# Patient Record
Sex: MALE | Race: WHITE | NOT HISPANIC OR LATINO | Employment: FULL TIME | URBAN - METROPOLITAN AREA
[De-identification: names, ages, dates, MRNs, and addresses within clinical notes are randomized per-mention and may not be internally consistent; named-entity substitution may affect disease eponyms.]

---

## 2018-01-30 ENCOUNTER — OFFICE VISIT (OUTPATIENT)
Dept: URGENT CARE | Facility: CLINIC | Age: 33
End: 2018-01-30
Payer: COMMERCIAL

## 2018-01-30 VITALS
WEIGHT: 130 LBS | SYSTOLIC BLOOD PRESSURE: 118 MMHG | DIASTOLIC BLOOD PRESSURE: 72 MMHG | HEIGHT: 70 IN | BODY MASS INDEX: 18.61 KG/M2 | TEMPERATURE: 103 F | HEART RATE: 107 BPM | OXYGEN SATURATION: 98 %

## 2018-01-30 DIAGNOSIS — R52 BODY ACHES: Primary | ICD-10-CM

## 2018-01-30 DIAGNOSIS — J10.1 INFLUENZA A: ICD-10-CM

## 2018-01-30 LAB
CTP QC/QA: YES
FLUAV AG NPH QL: POSITIVE
FLUBV AG NPH QL: NEGATIVE

## 2018-01-30 PROCEDURE — 99203 OFFICE O/P NEW LOW 30 MIN: CPT | Mod: S$GLB,,, | Performed by: NURSE PRACTITIONER

## 2018-01-30 PROCEDURE — 87804 INFLUENZA ASSAY W/OPTIC: CPT | Mod: 59,QW,S$GLB, | Performed by: NURSE PRACTITIONER

## 2018-01-30 PROCEDURE — 3008F BODY MASS INDEX DOCD: CPT | Mod: S$GLB,,, | Performed by: NURSE PRACTITIONER

## 2018-01-30 RX ORDER — FLUTICASONE PROPIONATE 50 MCG
2 SPRAY, SUSPENSION (ML) NASAL DAILY
Qty: 1 BOTTLE | Refills: 0 | Status: SHIPPED | OUTPATIENT
Start: 2018-01-30 | End: 2019-01-30

## 2018-01-30 RX ORDER — IBUPROFEN 200 MG
600 TABLET ORAL
Status: COMPLETED | OUTPATIENT
Start: 2018-01-30 | End: 2018-01-30

## 2018-01-30 RX ORDER — ACETAMINOPHEN 500 MG
500 TABLET ORAL EVERY 6 HOURS PRN
Status: ON HOLD | COMMUNITY
End: 2023-03-15

## 2018-01-30 RX ADMIN — Medication 600 MG: at 12:01

## 2018-01-30 NOTE — PATIENT INSTRUCTIONS
Influenza (Adult)    Influenza is also called the flu. It is a viral illness that affects the air passages of your lungs. It is different from the common cold. The flu can easily be passed from one to person to another. It may be spread through the air by coughing and sneezing. Or it can be spread by touching the sick person and then touching your own eyes, nose, or mouth.  The flu starts 1 to 3 days after you are exposed to the flu virus. It may last for 1 to 2 weeks but many people feel tired or fatigued for many weeks afterward. You usually dont need to take antibiotics unless you have a complication. This might be an ear or sinus infection or pneumonia.  Symptoms of the flu may be mild or severe. They can include extreme tiredness (wanting to stay in bed all day), chills, fevers, muscle aches, soreness with eye movement, headache, and a dry, hacking cough.  Home care  Follow these guidelines when caring for yourself at home:  · Avoid being around cigarette smoke, whether yours or other peoples.  · Acetaminophen or ibuprofen will help ease your fever, muscle aches, and headache. Dont give aspirin to anyone younger than 18 who has the flu. Aspirin can harm the liver.  · Nausea and loss of appetite are common with the flu. Eat light meals. Drink 6 to 8 glasses of liquids every day. Good choices are water, sport drinks, soft drinks without caffeine, juices, tea, and soup. Extra fluids will also help loosen secretions in your nose and lungs.  · Over-the-counter cold medicines will not make the flu go away faster. But the medicines may help with coughing, sore throat, and congestion in your nose and sinuses. Dont use a decongestant if you have high blood pressure.  · Stay home until your fever has been gone for at least 24 hours without using medicine to reduce fever.  Follow-up care  Follow up with your healthcare provider, or as advised, if you are not getting better over the next week.  If you are age 65 or  older, talk with your provider about getting a pneumococcal vaccine every 5 years. You should also get this vaccine if you have chronic asthma or COPD. All adults should get a flu vaccine every fall. Ask your provider about this.  When to seek medical advice  Call your healthcare provider right away if any of these occur:  · Cough with lots of colored mucus (sputum) or blood in your mucus  · Chest pain, shortness of breath, wheezing, or trouble breathing  · Severe headache, or face, neck, or ear pain  · New rash with fever  · Fever of 100.4°F (38°C) or higher, or as directed by your healthcare provider  · Confusion, behavior change, or seizure  · Severe weakness or dizziness  · You get a new fever or cough after getting better for a few days  Date Last Reviewed: 1/1/2017 © 2000-2017 [employer]. [address] [place] [address], [place] [address]. All rights reserved. This information is not intended as a substitute for professional medical care. Always follow your healthcare professional's instructions.    You have been diagnosed with Influenza.   You are contagious for 24 hours after you start the Tamilfu or 24 hours after your last fever, whichever happens last.  Please drink plenty of fluids.  Please get plenty of rest.  Please return here or go to the Emergency Department for any concerns or worsening of condition.  Tamiflu prescription has been discussed and if prescribed, please take to completion unless you cannot tolerate the side effects.   If you were prescribed a narcotic medication, do not drive or operate heavy equipment or machinery while taking these medications.  If you were given a steroid shot in the clinic and have also been given a prescription for a steroid such as Prednisone or a Medrol Dose Pack, please begin taking them tomorrow.  Take tylenol (acetominophen) for fever, chills or body aches every 4 hours. do not exceed 4000 mg/ day.  Take Motrin (Ibuprofen) every 4 hours for fever,  chills, pain or inflammation.  Use an antihistmine such as claritin or zyrtec to dry you out. Use pseudoephedrine (behind the counter) to decongest (beware this can raise your blood pressure). Use mucinex (guaifenisin) to break up mucous

## 2018-01-30 NOTE — LETTER
January 30, 2018      [facility]  [facility] [address]  Phone: [phone]  Fax: [phone]       Patient: Wilma Lamas   YOB: 1985  Date of Visit: 01/30/2018    To Whom It May Concern:    Roshni Lamas  was at [facility] on 01/30/2018. He may return to work/school on 02/03/2018 with no restrictions. If you have any questions or concerns, or if I can be of further assistance, please do not hesitate to contact me.    Sincerely,    Gilberto Mae NP

## 2018-01-30 NOTE — PROGRESS NOTES
"Subjective:       Patient ID: Wilma Lamas is a 32 y.o. male.    Vitals:  height is 5' 10" (1.778 m) and weight is 59 kg (130 lb). His temperature is 102.8 °F (39.3 °C) (abnormal). His blood pressure is 118/72 and his pulse is 107. His oxygen saturation is 98%.     Chief Complaint: Generalized Body Aches and Fever    Pt in for body aches, congestion, fever, sore throat. Pt symptoms started Friday and the fever really got bad on Sunday, but then pt woke up this mornign and the fever is back. Pt has been taking nyquil and tylenol. [place]. Pt is an oceanographer.       Fever    This is a new problem. The current episode started in the past 7 days. The problem has been gradually worsening. Associated symptoms include congestion, coughing, ear pain, headaches, muscle aches and a sore throat. Pertinent negatives include no abdominal pain, chest pain, diarrhea, nausea or wheezing. He has tried acetaminophen for the symptoms.     Review of Systems   Constitution: Positive for chills, decreased appetite, fever, weakness, malaise/fatigue and night sweats.   HENT: Positive for congestion, ear pain and sore throat. Negative for hoarse voice.    Eyes: Negative for discharge and redness.   Cardiovascular: Negative for chest pain, dyspnea on exertion and leg swelling.   Respiratory: Positive for cough, shortness of breath and sputum production. Negative for wheezing.    Musculoskeletal: Negative for myalgias.   Gastrointestinal: Negative for abdominal pain, constipation, diarrhea and nausea.   Neurological: Positive for dizziness, headaches and light-headedness.       Objective:      Physical Exam   Constitutional: He is oriented to person, place, and time. He appears well-developed and well-nourished. He is cooperative.  Non-toxic appearance. He does not appear ill. No distress.   Non toxic in appearance      HENT:   Head: Normocephalic and atraumatic.   Right Ear: Hearing, tympanic membrane, " external ear and ear canal normal.   Left Ear: Hearing, tympanic membrane, external ear and ear canal normal.   Nose: Mucosal edema present. No rhinorrhea or nasal deformity. No epistaxis. Right sinus exhibits no maxillary sinus tenderness and no frontal sinus tenderness. Left sinus exhibits no maxillary sinus tenderness and no frontal sinus tenderness.   Mouth/Throat: Uvula is midline and mucous membranes are normal. No trismus in the jaw. Normal dentition. No uvula swelling. Posterior oropharyngeal erythema present.   Eyes: Conjunctivae and lids are normal. Right eye exhibits no discharge. Left eye exhibits no discharge. No scleral icterus.   Sclera clear bilat   Neck: Trachea normal, normal range of motion, full passive range of motion without pain and phonation normal. Neck supple.   Cardiovascular: Regular rhythm, normal heart sounds, intact distal pulses and normal pulses.  Tachycardia present.    Temperature is 102.8 at this meli.    Pulmonary/Chest: Effort normal and breath sounds normal. No respiratory distress.   Abdominal: Soft. Normal appearance and bowel sounds are normal. He exhibits no distension, no pulsatile midline mass and no mass. There is no tenderness.   Musculoskeletal: Normal range of motion. He exhibits no edema or deformity.   Neurological: He is alert and oriented to person, place, and time. He exhibits normal muscle tone. Coordination normal.   Skin: Skin is warm, dry and intact. He is not diaphoretic. No pallor.   Psychiatric: He has a normal mood and affect. His speech is normal and behavior is normal. Judgment and thought content normal. Cognition and memory are normal.   Nursing note and vitals reviewed.      Results for orders placed or performed in visit on 01/30/18   POCT Influenza A/B   Result Value Ref Range    Rapid Influenza A Ag Positive (A) Negative    Rapid Influenza B Ag Negative Negative     Acceptable Yes        Assessment:       1. Body aches    2.  Influenza A        Plan:         Body aches  -     POCT Influenza A/B    Influenza A  -     fluticasone (FLONASE) 50 mcg/actuation nasal spray; 2 sprays (100 mcg total) by Each Nare route once daily.  Dispense: 1 Bottle; Refill: 0  -     ibuprofen tablet 600 mg; Take 3 tablets (600 mg total) by mouth one time.

## 2023-02-22 ENCOUNTER — TELEPHONE (OUTPATIENT)
Dept: LAB | Facility: CLINIC | Age: 38
End: 2023-02-22
Payer: COMMERCIAL

## 2023-02-22 NOTE — TELEPHONE ENCOUNTER
----- Message from Heaven Peterson sent at 2/22/2023 10:17 AM CST -----  Regarding: pt called  Name of Who is Calling: PRO CARPIO [[id number]]      What is the request in detail: pt needs to establish care and needs to be seen today for a urinary issue. Please advise       Can the clinic reply by [facility]: No      What Number to [facility] Back [facility]: [phone]

## 2023-02-23 ENCOUNTER — OFFICE VISIT (OUTPATIENT)
Dept: PRIMARY CARE CLINIC | Facility: CLINIC | Age: 38
End: 2023-02-23
Payer: COMMERCIAL

## 2023-02-23 VITALS
HEIGHT: 70 IN | TEMPERATURE: 98 F | HEART RATE: 65 BPM | DIASTOLIC BLOOD PRESSURE: 76 MMHG | SYSTOLIC BLOOD PRESSURE: 118 MMHG | WEIGHT: 133.63 LBS | OXYGEN SATURATION: 99 % | BODY MASS INDEX: 19.13 KG/M2 | RESPIRATION RATE: 18 BRPM

## 2023-02-23 DIAGNOSIS — R39.11 URINARY HESITANCY: ICD-10-CM

## 2023-02-23 DIAGNOSIS — R39.12 WEAK URINARY STREAM: ICD-10-CM

## 2023-02-23 DIAGNOSIS — R33.9 URINARY RETENTION: ICD-10-CM

## 2023-02-23 DIAGNOSIS — Z00.00 ENCOUNTER FOR MEDICAL EXAMINATION TO ESTABLISH CARE: Primary | ICD-10-CM

## 2023-02-23 PROCEDURE — 80053 COMPREHEN METABOLIC PANEL: CPT | Performed by: FAMILY MEDICINE

## 2023-02-23 PROCEDURE — 87389 HIV-1 AG W/HIV-1&-2 AB AG IA: CPT | Performed by: FAMILY MEDICINE

## 2023-02-23 PROCEDURE — 3074F PR MOST RECENT SYSTOLIC BLOOD PRESSURE < 130 MM HG: ICD-10-PCS | Mod: S$GLB,,, | Performed by: FAMILY MEDICINE

## 2023-02-23 PROCEDURE — 83036 HEMOGLOBIN GLYCOSYLATED A1C: CPT | Performed by: FAMILY MEDICINE

## 2023-02-23 PROCEDURE — 85025 COMPLETE CBC W/AUTO DIFF WBC: CPT | Performed by: FAMILY MEDICINE

## 2023-02-23 PROCEDURE — 86803 HEPATITIS C AB TEST: CPT | Performed by: FAMILY MEDICINE

## 2023-02-23 PROCEDURE — 80061 LIPID PANEL: CPT | Performed by: FAMILY MEDICINE

## 2023-02-23 PROCEDURE — 3078F DIAST BP <80 MM HG: CPT | Mod: S$GLB,,, | Performed by: FAMILY MEDICINE

## 2023-02-23 PROCEDURE — 3078F PR MOST RECENT DIASTOLIC BLOOD PRESSURE < 80 MM HG: ICD-10-PCS | Mod: S$GLB,,, | Performed by: FAMILY MEDICINE

## 2023-02-23 PROCEDURE — 99205 OFFICE O/P NEW HI 60 MIN: CPT | Mod: S$GLB,,, | Performed by: FAMILY MEDICINE

## 2023-02-23 PROCEDURE — 3008F PR BODY MASS INDEX (BMI) DOCUMENTED: ICD-10-PCS | Mod: S$GLB,,, | Performed by: FAMILY MEDICINE

## 2023-02-23 PROCEDURE — 99205 PR OFFICE/OUTPT VISIT, NEW, LEVL V, 60-74 MIN: ICD-10-PCS | Mod: S$GLB,,, | Performed by: FAMILY MEDICINE

## 2023-02-23 PROCEDURE — 84153 ASSAY OF PSA TOTAL: CPT | Performed by: FAMILY MEDICINE

## 2023-02-23 PROCEDURE — 3074F SYST BP LT 130 MM HG: CPT | Mod: S$GLB,,, | Performed by: FAMILY MEDICINE

## 2023-02-23 PROCEDURE — 3008F BODY MASS INDEX DOCD: CPT | Mod: S$GLB,,, | Performed by: FAMILY MEDICINE

## 2023-02-23 RX ORDER — TAMSULOSIN HYDROCHLORIDE 0.4 MG/1
1 CAPSULE ORAL
Status: ON HOLD | COMMUNITY
Start: 2023-02-17 | End: 2023-03-15

## 2023-02-23 NOTE — ASSESSMENT & PLAN NOTE
- concern for underlying prostate dysfunction, will draw a PSA today to rule out any abnormalities  - I would like to refer patient to urology given extensive history of urinary retention and urinary difficulties  - patient to notify office if he does not receive referral within approximately 1 week  - there could be a social component to this issue  - patient was unable to provide urinary sample today to rule out any underlying infection, due to presenting problem of urinary retention  - I do not personally feel this issue should be a cause for patient not to be able to return to work, he is cleared to return to work from my standpoint

## 2023-02-23 NOTE — ASSESSMENT & PLAN NOTE
- will notify patient of laboratory values via portal, and discuss with him at follow-up appointment in approximately 1 month

## 2023-02-23 NOTE — PROGRESS NOTES
"Subjective:       Patient ID: Wilma Lamas is a 37 y.o. male.    Chief Complaint: Establish Care and Dysuria (Problem urinating "as long as I can remember" )    37-year-old male presents to clinic to establish care and is complaining of urinary retention, urinary hesitancy, weak urinary stream.  Patient's wife is at bedside aiding in history.  Patient denies any past medical history, states he had surgery on his right leg due to fracture in the past.  Patient denies any family history in his mother or siblings.  Patient states that his father has a history of similar urinary symptoms, including retention, weak stream, hesitancy.  Patient states it is unknown if his father has BPH or other urinary disorder.  Patient denies any tobacco, alcohol, illicit drug use.  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    Today, patient states he has been experiencing urinary retention, weak urinary stream, having difficulty starting his urinary stream, feeling like he has to force the urine out.  Patient denies any difficulty stopping urine stream or feeling that he is not completely emptying his bladder.  Patient is able to obtain morning erections, and erections during sexual encounter, as well as ejaculation.  Patient states for an extended period of time since he was a teenager or before he has had difficulty urinating in public settings.  Patient states these issues have continued into his later life.  However, recently he has realized that his urinary issues are more than urinating in a the public setting, as they seem to be a functional issue given that he retains urine, is slow to start his urinary stream, and has to force the urine out.  Patient states he became cognizant of this when he had to take a random urinary drug test a few years ago for his job, states that it took him approximally 3 hours to urinate.  Patient states approximately 12 months ago he had to take a random urinary drug test for " work, he drank plenty of water, and still found it difficult to urinate.  Patient states last week he was notified that he would undergo a random urinary drug screen at 8-10AM in the morning, patient states he began to prepare for this with drinking plenty of water, states he drank approximally 64 hour oz of water to aid himself in urination due to his previous history of difficulty urinating.  Patient states he was told of the improper urinary drug screen time, and they changed the time to 12:30-2:00 p.m. in the afternoon.  Patient states he drank a normal amount of water during this timeframe and was unable to urinate when it was time for his random urinary drug screen.  Patient states this resulted in a leave of absence from his position.  Patient states he checks in every morning with his job but has received no further instruction regarding returning or documentation needed for return to work.  Discussed with patient if he needs any further documentation to please let me know and I am happy to fill this out.  Patient took it upon himself to go have a drug screen performed this week, hair and blood samples were taken for the drug screen.  I discussed with patient that I would like him to provide a urinary sample today to rule out any underlying infection, patient was unable to provide a urinary sample due to the aforementioned difficulty with urination, urinary retention.  Patient states last Friday when he was released from work he underwent a telehealth visit with a physician, he was prescribed Flomax.  Patient states he took Flomax today for the 1st time, does not notice any difference in his urinary issues.  No further complaints noted today.        Current Outpatient Medications:     tamsulosin (FLOMAX) 0.4 mg Cap, Take 1 capsule by mouth., Disp: , Rfl:     acetaminophen (TYLENOL) 500 MG tablet, Take 500 mg by mouth every 6 (six) hours as needed for Pain., Disp: , Rfl:     Review of patient's allergies  "indicates:  No Known Allergies    History reviewed. No pertinent past medical history.    Past Surgical History:   Procedure Laterality Date    FRACTURE SURGERY      LEG SURGERY         History reviewed. No pertinent family history.    Social History     Tobacco Use    Smoking status: Never    Smokeless tobacco: Never   Substance Use Topics    Alcohol use: No    Drug use: No       Review of Systems   Constitutional:  Negative for activity change, appetite change, fatigue, fever and unexpected weight change.   HENT:  Negative for ear discharge, ear pain, hearing loss and tinnitus.    Eyes:  Negative for photophobia, pain and visual disturbance.   Respiratory:  Negative for cough, shortness of breath and wheezing.    Cardiovascular:  Negative for chest pain and palpitations.   Gastrointestinal:  Negative for abdominal pain, blood in stool, constipation, diarrhea, nausea and vomiting.   Genitourinary:  Positive for decreased urine volume and difficulty urinating. Negative for dysuria, enuresis, erectile dysfunction, flank pain, frequency and hematuria.   Neurological:  Negative for weakness and headaches.       Current Medications:   Home Psychiatric Meds:   Psychotherapeutics (From admission, onward)      None                Objective:      Vitals:    02/23/23 1451   BP: 118/76   BP Location: Left arm   Patient Position: Sitting   BP Method: Medium (Manual)   Pulse: 65   Resp: 18   Temp: 98.2 °F (36.8 °C)   TempSrc: Oral   SpO2: 99%   Weight: 60.6 kg (133 lb 9.6 oz)   Height: 5' 10" (1.778 m)     Physical Exam  Vitals reviewed.   Constitutional:       Appearance: Normal appearance.   HENT:      Head: Normocephalic.      Right Ear: Tympanic membrane, ear canal and external ear normal.      Left Ear: Tympanic membrane, ear canal and external ear normal.      Nose: Nose normal.      Mouth/Throat:      Mouth: Mucous membranes are moist.   Eyes:      Pupils: Pupils are equal, round, and reactive to light.   Cardiovascular: "      Rate and Rhythm: Normal rate and regular rhythm.      Pulses: Normal pulses.      Heart sounds: Normal heart sounds.   Pulmonary:      Effort: Pulmonary effort is normal.      Breath sounds: Normal breath sounds.   Abdominal:      General: Bowel sounds are normal.      Palpations: Abdomen is soft.   Musculoskeletal:         General: Normal range of motion.      Cervical back: Normal range of motion and neck supple.   Skin:     General: Skin is warm and dry.   Neurological:      General: No focal deficit present.      Mental Status: He is alert and oriented to person, place, and time.   Psychiatric:         Mood and Affect: Mood normal.         Behavior: Behavior normal.         No results found for: WBC, HGB, HCT, PLT, CHOL, TRIG, HDL, LDLDIRECT, ALT, AST, NA, K, CL, CREATININE, BUN, CO2, TSH, PSA, INR, GLUF, HGBA1C, MICROALBUR   Assessment:       1. Encounter for medical examination to establish care    2. Urinary retention    3. Weak urinary stream    4. Urinary hesitancy          Plan:         Problem List Items Addressed This Visit          Renal/    Urinary retention     - concern for underlying prostate dysfunction, will draw a PSA today to rule out any abnormalities  - I would like to refer patient to urology given extensive history of urinary retention and urinary difficulties  - patient to notify office if he does not receive referral within approximately 1 week  - there could be a social component to this issue  - patient was unable to provide urinary sample today to rule out any underlying infection, due to presenting problem of urinary retention  - I do not personally feel this issue should be a cause for patient not to be able to return to work, he is cleared to return to work from my standpoint         Relevant Orders    Ambulatory referral/consult to Urology    Weak urinary stream     - as above         Relevant Orders    Ambulatory referral/consult to Urology    Urinary hesitancy     - as  above         Relevant Orders    Ambulatory referral/consult to Urology       Other    Encounter for medical examination to establish care - Primary     - will notify patient of laboratory values via portal, and discuss with him at follow-up appointment in approximately 1 month         Relevant Orders    CBC Auto Differential    Comprehensive Metabolic Panel    Hemoglobin A1C    Hepatitis C Antibody    HIV 1/2 Ag/Ab (4th Gen)    Lipid Panel    PSA, Screening         Follow up in about 1 month (around 3/23/2023), or if symptoms worsen or fail to improve.       Approximately 60  minutes were spent on this encounter. This includes face to face time and non-face to face time preparing to see the patient (eg, review of tests), obtaining and/or reviewing separately obtained history, documenting clinical information in the electronic or other health record, independently interpreting results and communicating results to the patient/family/caregiver, or care coordinator.    Instructed patient that if symptoms fail to improve or worsen patient should seek immediate medical attention or report to the nearest emergency department. Patient expressed verbal agreement and understanding to this plan of care.     This note was created using [employer] voice recognition software that occasionally misinterpreted phrases or words.     PRO Carlson MD

## 2023-02-24 ENCOUNTER — PATIENT MESSAGE (OUTPATIENT)
Dept: UROLOGY | Facility: CLINIC | Age: 38
End: 2023-02-24
Payer: COMMERCIAL

## 2023-02-24 LAB
ALBUMIN SERPL BCP-MCNC: 4.6 G/DL (ref 3.5–5.2)
ALP SERPL-CCNC: 83 U/L (ref 55–135)
ALT SERPL W/O P-5'-P-CCNC: 21 U/L (ref 10–44)
ANION GAP SERPL CALC-SCNC: 8 MMOL/L (ref 8–16)
AST SERPL-CCNC: 24 U/L (ref 10–40)
BASOPHILS # BLD AUTO: 0.02 K/UL (ref 0–0.2)
BASOPHILS NFR BLD: 0.3 % (ref 0–1.9)
BILIRUB SERPL-MCNC: 0.5 MG/DL (ref 0.1–1)
BUN SERPL-MCNC: 16 MG/DL (ref 6–20)
CALCIUM SERPL-MCNC: 9.8 MG/DL (ref 8.7–10.5)
CHLORIDE SERPL-SCNC: 104 MMOL/L (ref 95–110)
CHOLEST SERPL-MCNC: 148 MG/DL (ref 120–199)
CHOLEST/HDLC SERPL: 2.8 {RATIO} (ref 2–5)
CO2 SERPL-SCNC: 27 MMOL/L (ref 23–29)
COMPLEXED PSA SERPL-MCNC: 0.68 NG/ML (ref 0–4)
CREAT SERPL-MCNC: 0.9 MG/DL (ref 0.5–1.4)
DIFFERENTIAL METHOD: NORMAL
EOSINOPHIL # BLD AUTO: 0.4 K/UL (ref 0–0.5)
EOSINOPHIL NFR BLD: 5.5 % (ref 0–8)
ERYTHROCYTE [DISTWIDTH] IN BLOOD BY AUTOMATED COUNT: 11.5 % (ref 11.5–14.5)
EST. GFR  (NO RACE VARIABLE): >60 ML/MIN/1.73 M^2
ESTIMATED AVG GLUCOSE: 94 MG/DL (ref 68–131)
GLUCOSE SERPL-MCNC: 92 MG/DL (ref 70–110)
HBA1C MFR BLD: 4.9 % (ref 4–5.6)
HCT VFR BLD AUTO: 46.6 % (ref 40–54)
HCV AB SERPL QL IA: NORMAL
HDLC SERPL-MCNC: 53 MG/DL (ref 40–75)
HDLC SERPL: 35.8 % (ref 20–50)
HGB BLD-MCNC: 16.3 G/DL (ref 14–18)
HIV 1+2 AB+HIV1 P24 AG SERPL QL IA: NORMAL
IMM GRANULOCYTES # BLD AUTO: 0 K/UL (ref 0–0.04)
IMM GRANULOCYTES NFR BLD AUTO: 0 % (ref 0–0.5)
LDLC SERPL CALC-MCNC: 84 MG/DL (ref 63–159)
LYMPHOCYTES # BLD AUTO: 1.9 K/UL (ref 1–4.8)
LYMPHOCYTES NFR BLD: 29.1 % (ref 18–48)
MCH RBC QN AUTO: 30.8 PG (ref 27–31)
MCHC RBC AUTO-ENTMCNC: 35 G/DL (ref 32–36)
MCV RBC AUTO: 88 FL (ref 82–98)
MONOCYTES # BLD AUTO: 0.5 K/UL (ref 0.3–1)
MONOCYTES NFR BLD: 7.9 % (ref 4–15)
NEUTROPHILS # BLD AUTO: 3.6 K/UL (ref 1.8–7.7)
NEUTROPHILS NFR BLD: 57.2 % (ref 38–73)
NONHDLC SERPL-MCNC: 95 MG/DL
NRBC BLD-RTO: 0 /100 WBC
PLATELET # BLD AUTO: 204 K/UL (ref 150–450)
PMV BLD AUTO: 10.6 FL (ref 9.2–12.9)
POTASSIUM SERPL-SCNC: 5 MMOL/L (ref 3.5–5.1)
PROT SERPL-MCNC: 7.7 G/DL (ref 6–8.4)
RBC # BLD AUTO: 5.29 M/UL (ref 4.6–6.2)
SODIUM SERPL-SCNC: 139 MMOL/L (ref 136–145)
TRIGL SERPL-MCNC: 55 MG/DL (ref 30–150)
WBC # BLD AUTO: 6.36 K/UL (ref 3.9–12.7)

## 2023-03-01 ENCOUNTER — OFFICE VISIT (OUTPATIENT)
Dept: UROLOGY | Facility: CLINIC | Age: 38
End: 2023-03-01
Payer: COMMERCIAL

## 2023-03-01 VITALS
HEART RATE: 74 BPM | SYSTOLIC BLOOD PRESSURE: 121 MMHG | DIASTOLIC BLOOD PRESSURE: 85 MMHG | WEIGHT: 133 LBS | HEIGHT: 70 IN | BODY MASS INDEX: 19.04 KG/M2

## 2023-03-01 DIAGNOSIS — R33.9 URINARY RETENTION: Primary | ICD-10-CM

## 2023-03-01 DIAGNOSIS — R39.11 URINARY HESITANCY: ICD-10-CM

## 2023-03-01 DIAGNOSIS — R39.12 WEAK URINARY STREAM: ICD-10-CM

## 2023-03-01 LAB — POC RESIDUAL URINE VOLUME: 300 ML (ref 0–100)

## 2023-03-01 PROCEDURE — 3044F HG A1C LEVEL LT 7.0%: CPT | Mod: CPTII,S$GLB,, | Performed by: UROLOGY

## 2023-03-01 PROCEDURE — 51798 US URINE CAPACITY MEASURE: CPT | Mod: S$GLB,,, | Performed by: UROLOGY

## 2023-03-01 PROCEDURE — 3074F SYST BP LT 130 MM HG: CPT | Mod: CPTII,S$GLB,, | Performed by: UROLOGY

## 2023-03-01 PROCEDURE — 3074F PR MOST RECENT SYSTOLIC BLOOD PRESSURE < 130 MM HG: ICD-10-PCS | Mod: CPTII,S$GLB,, | Performed by: UROLOGY

## 2023-03-01 PROCEDURE — 3044F PR MOST RECENT HEMOGLOBIN A1C LEVEL <7.0%: ICD-10-PCS | Mod: CPTII,S$GLB,, | Performed by: UROLOGY

## 2023-03-01 PROCEDURE — 3008F BODY MASS INDEX DOCD: CPT | Mod: CPTII,S$GLB,, | Performed by: UROLOGY

## 2023-03-01 PROCEDURE — 1160F PR REVIEW ALL MEDS BY PRESCRIBER/CLIN PHARMACIST DOCUMENTED: ICD-10-PCS | Mod: CPTII,S$GLB,, | Performed by: UROLOGY

## 2023-03-01 PROCEDURE — 1160F RVW MEDS BY RX/DR IN RCRD: CPT | Mod: CPTII,S$GLB,, | Performed by: UROLOGY

## 2023-03-01 PROCEDURE — 3079F DIAST BP 80-89 MM HG: CPT | Mod: CPTII,S$GLB,, | Performed by: UROLOGY

## 2023-03-01 PROCEDURE — 99999 PR PBB SHADOW E&M-EST. PATIENT-LVL IV: ICD-10-PCS | Mod: PBBFAC,,, | Performed by: UROLOGY

## 2023-03-01 PROCEDURE — 1159F MED LIST DOCD IN RCRD: CPT | Mod: CPTII,S$GLB,, | Performed by: UROLOGY

## 2023-03-01 PROCEDURE — 3008F PR BODY MASS INDEX (BMI) DOCUMENTED: ICD-10-PCS | Mod: CPTII,S$GLB,, | Performed by: UROLOGY

## 2023-03-01 PROCEDURE — 99204 PR OFFICE/OUTPT VISIT, NEW, LEVL IV, 45-59 MIN: ICD-10-PCS | Mod: S$GLB,,, | Performed by: UROLOGY

## 2023-03-01 PROCEDURE — 3079F PR MOST RECENT DIASTOLIC BLOOD PRESSURE 80-89 MM HG: ICD-10-PCS | Mod: CPTII,S$GLB,, | Performed by: UROLOGY

## 2023-03-01 PROCEDURE — 1159F PR MEDICATION LIST DOCUMENTED IN MEDICAL RECORD: ICD-10-PCS | Mod: CPTII,S$GLB,, | Performed by: UROLOGY

## 2023-03-01 PROCEDURE — 99204 OFFICE O/P NEW MOD 45 MIN: CPT | Mod: S$GLB,,, | Performed by: UROLOGY

## 2023-03-01 PROCEDURE — 99999 PR PBB SHADOW E&M-EST. PATIENT-LVL IV: CPT | Mod: PBBFAC,,, | Performed by: UROLOGY

## 2023-03-01 PROCEDURE — 51798 POCT BLADDER SCAN: ICD-10-PCS | Mod: S$GLB,,, | Performed by: UROLOGY

## 2023-03-01 NOTE — PROGRESS NOTES
"[facility] New Patient/H&P:    Wilma Lamas is a 37 y.o. male who presents for difficulty urinating.    Patient presents with a several year history of difficulty voiding. He reports progressively worsening incomplete emptying, frequency, intermittency, weak stream, straining and nocturia x 1.     He reports social anxiety as it relates to voiding. States that he was bullied as a kid in the bathroom and believes it has made him very scared to void in public. However, he reports persistent LUTS at home as well. Also has to frequently sit down to void.     States he has required a catheter twice in the past after his leg surgery. Unable to provide a urine specimen in the office. Random bladder scan 300 mL.     He was prescribed Flomax 0.4 mg PO daily per a [facility]-health clinic, but has not started the medication. He has never seen a urologist. No cystoscopy in the past.     Of note, he had a vasectomy in approximately 2017.     Works at [employer].     Denies any fever, chills, gross hematuria, bone pain, unintentional weight loss,  trauma or history of  malignancy.       PSA  0.68  2/23/23    A1C  4.9  2/23/23    IPSS QoL  21 4 3/1/23    Random bladder scan  300 mL 3/1/23    No past medical history on file.      Past Surgical History:   Procedure Laterality Date    FRACTURE SURGERY      LEG SURGERY         No family history on file.    Review of patient's allergies indicates:  No Known Allergies    Medications Reviewed: see MAR      FOCUSED PHYSICAL EXAM:    Vitals:    03/01/23 1054   BP: 121/85   Pulse: 74     Body mass index is 19.08 kg/m². Weight: 60.3 kg (133 lb) Height: 5' 10" (177.8 cm)       General: Alert, cooperative, no distress, appears stated age  Abdomen: Soft, non-tender, no CVA tenderness, non-distended  : Circumcised, normal phallus without plaques/lesions, bilaterally descended testicles without lesions      LABS:    Recent Results (from the past 336 hour(s))   PSA, " Screening    Collection Time: 02/23/23  3:59 PM   Result Value Ref Range    PSA, Screen 0.68 0.00 - 4.00 ng/mL   Lipid Panel    Collection Time: 02/23/23  3:59 PM   Result Value Ref Range    Cholesterol 148 120 - 199 mg/dL    Triglycerides 55 30 - 150 mg/dL    HDL 53 40 - 75 mg/dL    LDL Cholesterol 84.0 63.0 - 159.0 mg/dL    HDL/Cholesterol Ratio 35.8 20.0 - 50.0 %    Total Cholesterol/HDL Ratio 2.8 2.0 - 5.0    Non-HDL Cholesterol 95 mg/dL   HIV 1/2 Ag/Ab (4th Gen)    Collection Time: 02/23/23  3:59 PM   Result Value Ref Range    HIV 1/2 Ag/Ab Non-reactive Non-reactive   Hepatitis C Antibody    Collection Time: 02/23/23  3:59 PM   Result Value Ref Range    Hepatitis C Ab Non-reactive Non-reactive   Hemoglobin A1C    Collection Time: 02/23/23  3:59 PM   Result Value Ref Range    Hemoglobin A1C 4.9 4.0 - 5.6 %    Estimated Avg Glucose 94 68 - 131 mg/dL   Comprehensive Metabolic Panel    Collection Time: 02/23/23  3:59 PM   Result Value Ref Range    Sodium 139 136 - 145 mmol/L    Potassium 5.0 3.5 - 5.1 mmol/L    Chloride 104 95 - 110 mmol/L    CO2 27 23 - 29 mmol/L    Glucose 92 70 - 110 mg/dL    BUN 16 6 - 20 mg/dL    Creatinine 0.9 0.5 - 1.4 mg/dL    Calcium 9.8 8.7 - 10.5 mg/dL    Total Protein 7.7 6.0 - 8.4 g/dL    Albumin 4.6 3.5 - 5.2 g/dL    Total Bilirubin 0.5 0.1 - 1.0 mg/dL    Alkaline Phosphatase 83 55 - 135 U/L    AST 24 10 - 40 U/L    ALT 21 10 - 44 U/L    Anion Gap 8 8 - 16 mmol/L    eGFR >60.0 >60 mL/min/1.73 m^2   CBC Auto Differential    Collection Time: 02/23/23  3:59 PM   Result Value Ref Range    WBC 6.36 3.90 - 12.70 K/uL    RBC 5.29 4.60 - 6.20 M/uL    Hemoglobin 16.3 14.0 - 18.0 g/dL    Hematocrit 46.6 40.0 - 54.0 %    MCV 88 82 - 98 fL    MCH 30.8 27.0 - 31.0 pg    MCHC 35.0 32.0 - 36.0 g/dL    RDW 11.5 11.5 - 14.5 %    Platelets 204 150 - 450 K/uL    MPV 10.6 9.2 - 12.9 fL    Immature Granulocytes 0.0 0.0 - 0.5 %    Gran # (ANC) 3.6 1.8 - 7.7 K/uL    Immature Grans (Abs) 0.00 0.00 - 0.04  K/uL    Lymph # 1.9 1.0 - 4.8 K/uL    Mono # 0.5 0.3 - 1.0 K/uL    Eos # 0.4 0.0 - 0.5 K/uL    Baso # 0.02 0.00 - 0.20 K/uL    nRBC 0 0 /100 WBC    Gran % 57.2 38.0 - 73.0 %    Lymph % 29.1 18.0 - 48.0 %    Mono % 7.9 4.0 - 15.0 %    Eosinophil % 5.5 0.0 - 8.0 %    Basophil % 0.3 0.0 - 1.9 %    Differential Method Automated    POCT Bladder Scan    Collection Time: 03/01/23 11:05 AM   Result Value Ref Range    POC Residual Urine Volume 300 (A) 0 - 100 mL           Assessment/Diagnosis:    1. Urinary retention  Ambulatory referral/consult to Urology    POCT Bladder Scan    Place in Outpatient    Case Request Operating Room: CYSTOSCOPY    Reason for no VTE Prophylaxis      2. Weak urinary stream  Ambulatory referral/consult to Urology    Place in Outpatient    Case Request Operating Room: CYSTOSCOPY    Reason for no VTE Prophylaxis      3. Urinary hesitancy  Ambulatory referral/consult to Urology          Plans:    - I spent 45 minutes of the day of this encounter preparing for, treating and managing this patient. Extensive discussion with patient regarding the etiology and management of his lower urinary tract symptoms. Explained that LUTS are multifactorial and can be secondary to an enlarged prostate, PO intake of bladder irritants, overactive bladder, constipation, malignancy, trauma, infection, stones or medications. We discussed that there is a potential benefit to further evaluation with cystoscopy.   - Scheduled for [facility] on 3/15/23 at the [facility].   - Further plan contingent on results.

## 2023-03-01 NOTE — H&P (VIEW-ONLY)
"[facility] New Patient/H&P:    Wilma Lamas is a 37 y.o. male who presents for difficulty urinating.    Patient presents with a several year history of difficulty voiding. He reports progressively worsening incomplete emptying, frequency, intermittency, weak stream, straining and nocturia x 1.     He reports social anxiety as it relates to voiding. States that he was bullied as a kid in the bathroom and believes it has made him very scared to void in public. However, he reports persistent LUTS at home as well. Also has to frequently sit down to void.     States he has required a catheter twice in the past after his leg surgery. Unable to provide a urine specimen in the office. Random bladder scan 300 mL.     He was prescribed Flomax 0.4 mg PO daily per a [facility]health clinic, but has not started the medication. He has never seen a urologist. No cystoscopy in the past.     Of note, he had a vasectomy in approximately 2017.     Works at [employer].     Denies any fever, chills, gross hematuria, bone pain, unintentional weight loss,  trauma or history of  malignancy.       PSA  0.68  2/23/23    A1C  4.9  2/23/23    IPSS QoL  21 4 3/1/23    Random bladder scan  300 mL 3/1/23    No past medical history on file.      Past Surgical History:   Procedure Laterality Date    FRACTURE SURGERY      LEG SURGERY         No family history on file.    Review of patient's allergies indicates:  No Known Allergies    Medications Reviewed: see MAR      FOCUSED PHYSICAL EXAM:    Vitals:    03/01/23 1054   BP: 121/85   Pulse: 74     Body mass index is 19.08 kg/m². Weight: 60.3 kg (133 lb) Height: 5' 10" (177.8 cm)       General: Alert, cooperative, no distress, appears stated age  Abdomen: Soft, non-tender, no CVA tenderness, non-distended  : Circumcised, normal phallus without plaques/lesions, bilaterally descended testicles without lesions      LABS:    Recent Results (from the past 336 hour(s))   PSA, " Screening    Collection Time: 02/23/23  3:59 PM   Result Value Ref Range    PSA, Screen 0.68 0.00 - 4.00 ng/mL   Lipid Panel    Collection Time: 02/23/23  3:59 PM   Result Value Ref Range    Cholesterol 148 120 - 199 mg/dL    Triglycerides 55 30 - 150 mg/dL    HDL 53 40 - 75 mg/dL    LDL Cholesterol 84.0 63.0 - 159.0 mg/dL    HDL/Cholesterol Ratio 35.8 20.0 - 50.0 %    Total Cholesterol/HDL Ratio 2.8 2.0 - 5.0    Non-HDL Cholesterol 95 mg/dL   HIV 1/2 Ag/Ab (4th Gen)    Collection Time: 02/23/23  3:59 PM   Result Value Ref Range    HIV 1/2 Ag/Ab Non-reactive Non-reactive   Hepatitis C Antibody    Collection Time: 02/23/23  3:59 PM   Result Value Ref Range    Hepatitis C Ab Non-reactive Non-reactive   Hemoglobin A1C    Collection Time: 02/23/23  3:59 PM   Result Value Ref Range    Hemoglobin A1C 4.9 4.0 - 5.6 %    Estimated Avg Glucose 94 68 - 131 mg/dL   Comprehensive Metabolic Panel    Collection Time: 02/23/23  3:59 PM   Result Value Ref Range    Sodium 139 136 - 145 mmol/L    Potassium 5.0 3.5 - 5.1 mmol/L    Chloride 104 95 - 110 mmol/L    CO2 27 23 - 29 mmol/L    Glucose 92 70 - 110 mg/dL    BUN 16 6 - 20 mg/dL    Creatinine 0.9 0.5 - 1.4 mg/dL    Calcium 9.8 8.7 - 10.5 mg/dL    Total Protein 7.7 6.0 - 8.4 g/dL    Albumin 4.6 3.5 - 5.2 g/dL    Total Bilirubin 0.5 0.1 - 1.0 mg/dL    Alkaline Phosphatase 83 55 - 135 U/L    AST 24 10 - 40 U/L    ALT 21 10 - 44 U/L    Anion Gap 8 8 - 16 mmol/L    eGFR >60.0 >60 mL/min/1.73 m^2   CBC Auto Differential    Collection Time: 02/23/23  3:59 PM   Result Value Ref Range    WBC 6.36 3.90 - 12.70 K/uL    RBC 5.29 4.60 - 6.20 M/uL    Hemoglobin 16.3 14.0 - 18.0 g/dL    Hematocrit 46.6 40.0 - 54.0 %    MCV 88 82 - 98 fL    MCH 30.8 27.0 - 31.0 pg    MCHC 35.0 32.0 - 36.0 g/dL    RDW 11.5 11.5 - 14.5 %    Platelets 204 150 - 450 K/uL    MPV 10.6 9.2 - 12.9 fL    Immature Granulocytes 0.0 0.0 - 0.5 %    Gran # (ANC) 3.6 1.8 - 7.7 K/uL    Immature Grans (Abs) 0.00 0.00 - 0.04  K/uL    Lymph # 1.9 1.0 - 4.8 K/uL    Mono # 0.5 0.3 - 1.0 K/uL    Eos # 0.4 0.0 - 0.5 K/uL    Baso # 0.02 0.00 - 0.20 K/uL    nRBC 0 0 /100 WBC    Gran % 57.2 38.0 - 73.0 %    Lymph % 29.1 18.0 - 48.0 %    Mono % 7.9 4.0 - 15.0 %    Eosinophil % 5.5 0.0 - 8.0 %    Basophil % 0.3 0.0 - 1.9 %    Differential Method Automated    POCT Bladder Scan    Collection Time: 03/01/23 11:05 AM   Result Value Ref Range    POC Residual Urine Volume 300 (A) 0 - 100 mL           Assessment/Diagnosis:    1. Urinary retention  Ambulatory referral/consult to Urology    POCT Bladder Scan    Place in Outpatient    Case Request Operating Room: CYSTOSCOPY    Reason for no VTE Prophylaxis      2. Weak urinary stream  Ambulatory referral/consult to Urology    Place in Outpatient    Case Request Operating Room: CYSTOSCOPY    Reason for no VTE Prophylaxis      3. Urinary hesitancy  Ambulatory referral/consult to Urology          Plans:    - I spent 45 minutes of the day of this encounter preparing for, treating and managing this patient. Extensive discussion with patient regarding the etiology and management of his lower urinary tract symptoms. Explained that LUTS are multifactorial and can be secondary to an enlarged prostate, PO intake of bladder irritants, overactive bladder, constipation, malignancy, trauma, infection, stones or medications. We discussed that there is a potential benefit to further evaluation with cystoscopy.   - Scheduled for [facility] on 3/15/23 at the [facility].   - Further plan contingent on results.

## 2023-03-15 ENCOUNTER — HOSPITAL ENCOUNTER (OUTPATIENT)
Facility: AMBULARY SURGERY CENTER | Age: 38
Discharge: HOME | End: 2023-03-15
Attending: UROLOGY | Admitting: UROLOGY
Payer: COMMERCIAL

## 2023-03-15 DIAGNOSIS — R39.12 WEAK URINARY STREAM: ICD-10-CM

## 2023-03-15 DIAGNOSIS — R33.9 URINARY RETENTION: ICD-10-CM

## 2023-03-15 DIAGNOSIS — R39.12 WEAK URINARY STREAM: Primary | ICD-10-CM

## 2023-03-15 DIAGNOSIS — R39.11 URINARY HESITANCY: ICD-10-CM

## 2023-03-15 DIAGNOSIS — R39.11 URINARY HESITANCY: Primary | ICD-10-CM

## 2023-03-15 PROCEDURE — 52000 CYSTOURETHROSCOPY: CPT | Mod: ,,, | Performed by: UROLOGY

## 2023-03-15 PROCEDURE — 52000 PR CYSTOURETHROSCOPY: ICD-10-PCS | Mod: ,,, | Performed by: UROLOGY

## 2023-03-15 PROCEDURE — 52000 CYSTOURETHROSCOPY: CPT | Performed by: UROLOGY

## 2023-03-15 RX ORDER — LIDOCAINE HYDROCHLORIDE 20 MG/ML
JELLY TOPICAL
Status: DISCONTINUED
Start: 2023-03-15 | End: 2023-03-15 | Stop reason: HOSPADM

## 2023-03-15 RX ORDER — WATER 1 ML/ML
IRRIGANT IRRIGATION
Status: DISCONTINUED | OUTPATIENT
Start: 2023-03-15 | End: 2023-03-15 | Stop reason: HOSPADM

## 2023-03-15 RX ORDER — LIDOCAINE HYDROCHLORIDE 20 MG/ML
JELLY TOPICAL
Status: DISCONTINUED | OUTPATIENT
Start: 2023-03-15 | End: 2023-03-15 | Stop reason: HOSPADM

## 2023-03-15 NOTE — OP NOTE
[facility]  Operative/Discharge Note    Date: 03/15/2023    Pre-Op Diagnosis: Weak urinary stream    Post-Op Diagnosis: Same    Procedure(s) Performed:   1.  Cystoscopy     Specimen(s): None    Staff Surgeon: Jamaal Barragan MD    Assistant Surgeon: Jamaal Barragan Jr, MD    Anesthesia: Local anesthesia topical 2% lidocaine gel    Indications: Wilma Lamas is a 37 y.o. male with weak urinary stream.     Findings: Unremarkable cystoscopy.    Estimated Blood Loss: min    Drains: None    Procedure in Detail:  After risks, benefits and possible complications of cystoscopy were explained, the patient elected to undergo the procedure and informed consent was obtained. All questions were answered in the bailey-operative area. The patient was transferred to the cystoscopy suite and placed in the lithotomy position.  The patient was prepped and draped in the usual sterile fashion. Lidocaine jelly was administered for local anesthesia. Time out was performed.    A flexible cystoscope was introduced into the bladder per urethra. This passed easily.  The entire urethra was visualized which showed no masses or strictures.  The prostate was 2 cm in length and appeared non-obstructing. The right and left ureteral orifices were identified in the normal anatomic position and were seen effluxing clear urine.  Formal cystoscopy was performed which revealed no masses or lesions suspicious for malignancy, no trabeculations, no bladder stones and no bladder diverticula.      The patient tolerated the procedure well and was transferred to recovery in stable condition.    Disposition: Home    Discharge home today status post uncomplicated procedure as above  Diet - resume home diet  Follow up: We discussed that his difficulty is likely secondary to pelvic floor dysfunction. Referral placed for pelvic floor physical therapy as his cystoscopy was normal. Do not start Flomax was prescribed from a tele-health clinic.  Instructions: PT follow  up.   Meds:     Medication List      You have not been prescribed any medications.         Jamaal Barragan Jr, MD

## 2023-03-16 VITALS
HEIGHT: 70 IN | WEIGHT: 133 LBS | SYSTOLIC BLOOD PRESSURE: 118 MMHG | RESPIRATION RATE: 20 BRPM | OXYGEN SATURATION: 100 % | BODY MASS INDEX: 19.04 KG/M2 | DIASTOLIC BLOOD PRESSURE: 74 MMHG | TEMPERATURE: 98 F | HEART RATE: 85 BPM

## 2023-03-17 ENCOUNTER — PATIENT MESSAGE (OUTPATIENT)
Dept: UROLOGY | Facility: CLINIC | Age: 38
End: 2023-03-17
Payer: COMMERCIAL

## 2023-03-22 ENCOUNTER — CLINICAL SUPPORT (OUTPATIENT)
Dept: REHABILITATION | Facility: HOSPITAL | Age: 38
End: 2023-03-22
Attending: UROLOGY
Payer: COMMERCIAL

## 2023-03-22 DIAGNOSIS — M62.81 MUSCLE WEAKNESS: ICD-10-CM

## 2023-03-22 DIAGNOSIS — R39.12 WEAK URINARY STREAM: ICD-10-CM

## 2023-03-22 DIAGNOSIS — R39.11 URINARY HESITANCY: Primary | ICD-10-CM

## 2023-03-22 PROCEDURE — 97530 THERAPEUTIC ACTIVITIES: CPT | Mod: PN

## 2023-03-22 PROCEDURE — 97162 PT EVAL MOD COMPLEX 30 MIN: CPT | Mod: PN

## 2023-03-22 NOTE — PROGRESS NOTES
"[facility]  Pelvic Health Physical Therapy Initial Evaluation    Date: 3/22/2023   Name: Wilma Lamas  Clinic Number: [id number]  Therapy Diagnosis:   Encounter Diagnoses   Name Primary?    Weak urinary stream     Urinary hesitancy Yes    Muscle weakness      Physician: Jamaal Barragan Jr., MD    Physician Orders: PT Eval and Treat   Medical Diagnosis from Referral: Weak urinary stream [R39.12], Urinary hesitancy [R39.11]  Evaluation Date: 3/22/2023  Authorization Period Expiration: 03/15/2023 - 03/14/2024  Plan of Care Expiration: 06/14/2023  Visit # / Visits authorized: 1/ 1    Time In: 12:30PM  Time Out: 01:30PM  Total Appointment Time (timed & untimed codes): 60 minutes    Precautions: Standard    Subjective     Date of onset: 2020    History of current condition - Wilma reports: for as long as he can remember, he has had issues urinating in public. If other people were in the restroom, he was unable to urinate and would leave the restroom without emptying. About 3 years ago, he began a new job which required a urine sample and "I struggled with that" but eventually was able to urinate after about 3 hours. This happened again in 2022. On February 16, 2023, he was scheduled for a drug test which required a urine sample but was unable to provide a sample. Since then he has been on paid administrative leave. A recent cystoscopy "revealed that everything was perfectly normal for someone my age".    Surgical History: Vasectomy 2017  Sexually active currently?  Yes  Sexually active prior to surgery?  Yes  Pain with sexual activity?  No pain during, sometimes has testicular pain 7/10 after ejaculation that lasts ~1 hour but this is sporadic.  Able to attain erection?:  Yes  Able to maintain erection?:  Yes  Able to attain orgasm?  Yes, sometimes   Climacturia?  No  Penile length changes?  No  Penile sensory changes?  No    Pain:  Location: N/A  Current 0/10    Bladder/Bowel History: trouble initiating " "urine stream  Frequency of urination:   Daytime: 1x every 3 hours           Nighttime: 1x per night, only happens ~1x per week.  Difficulty initiating urine stream: Yes, takes about 60 seconds to initiate urine stream.  Urine stream: weak  Complete emptying: Yes  Bladder leakage: No  Frequency of incidents: N/A  Amount leaked (urine): N/A  Urinary Urgency: No, Able to delay the urge for at least 60 minute(s).  Frequency of bowel movements: once a day  Difficulty initiating BM: No, strains ~1x/week but states it is "infrequent"  Quality/Shape of BM: [place] Stool Chart Type 4  Does Patient Feel Empty after BM? Yes  Fiber Supplements or Laxative Use? No  Colon leakage: No  Frequency of incidents: N/A   Amount leaked (bowels):  None  Form of protection: none  Number of pads required in 24 hours: N/A       Medical History: Wilma  has no past medical history on file.     Surgical History: Wilma Lamas  has a past surgical history that includes Leg Surgery; Fracture surgery; and Cystoscopy (N/A, 3/15/2023).    Medications: Wilma currently has no medications in their medication list.    Allergies: Review of patient's allergies indicates:  No Known Allergies     Imaging Cystoscopy - unremarkable per patient's report.    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  Prior Level of Function: Patient has always had trouble urinating in public spaces since childhood. Symptoms have exacerbated the past 3 years. Prior to 3 years ago, patient tolerated sexual intercourse and ejaculation without pain.  Current Level of Function: Patient experiences urinary hesitancy in public spaces which prevents bladder emptying; pain after ejaculation which affects intimacy with spouse.     Types of fluid intake: Water (32oz.), Cup of tea " daily when at work; only has alcohol 1-2x per year.  Diet: Breakfast - yogurt, granola, fruit; Lunch - sandwich (turkey), grapes and chips; Dinner - cooks at home (meat, vegetables, starches)   Habitus: thin  Abuse/Neglect: No     Pts goals: To be able to use public restrooms comfortably.    OBJECTIVE     See EMR under MEDIA for written consent provided 3/22/2023  Chaperone: declined    ORTHO SCREEN  Posture in sitting: slouched  and sacral sitting  Posture in standing: forward and rounded shoulders  and sway back  Pelvic alignment: no sign of deviations noted in supine   SI Joint Palpation: Denies tenderness to SI joint palpation bilaterally.  Sacral spring test: negative   Adductor Palpation: increased tension   Hip Strength:  Hip Left Right   Flexion 4/5 4/5   Abduction 4/5 4/5   Adduction 4+/5 4+/5   Extension 4/5 4/5   External Rot 4/5 4/5   Internal Rot 4/5 4/5     FLEXIBILITY:  B Hamstring - Moderate Deficit  B Adductors - Mild Deficit    RANGE OF MOTION:  R Hip External Rotation: 55deg  L Hip External Rotation: 52deg  R Hip Internal Rotation: 50deg  L Hip Internal Rotation: 50deg    ABDOMINAL WALL ASSESSMENT  Palpation: trigger points to left oblique  Abdominal strength: Transverse abdominus: 4-/5  Scarring: None  Pelvic Floor Muscle and Transverse Abdominus Synergy: absent  Diastasis: Not tested    BREATHING MECHANICS ASSESSMENT   Thorax Assessment During Quiet Respiration: Decreased excursion of abdominal wall   Thorax Assessment During Deep Respiration: Decreased excursion of abdominal wall  and Decreased excursion bilaterally of lateral ribs     RECTAL PELVIC FLOOR EXAM    EXTERNAL ASSESSMENT  Anus: WNL  Skin condition: WNL   Scarring: none  Sensation: WNL   Pain: none  Voluntary contraction: accessory muscle use (gluteal musculature activation and breath holding noticeable)  Voluntary relaxation:  minimal  Involuntary contraction: reflex tightening  Bearing down: nil  Anal [place]: intact  Discharge: none        INTERNAL ASSESSMENT deferred at initial evaluation due to patient's preference.    Limitation/Restriction for FOTO Urinary Problem Survey    Therapist reviewed FOTO scores for Wilma Lamas on 3/22/2023.   FOTO documents entered into [facility] - see Media section.    Limitation Score: 21%       TREATMENT     Treatment Time In: 1:15PM  Treatment Time Out: 1:30PM  Total Treatment time (time-based codes) separate from Evaluation: 15 minutes    Therapeutic Activity Patient participated in dynamic functional therapeutic activities to improve functional performance for 15 minutes. Including: Education as described below.     Patient Education provided:   general anatomy/physiology of urinary/ bowel  system, benefits of treatment, and risks of treatment was discussed with the pt. Additionally, anatomy/physiology of pelvic floor, posture/body mechanices, diaphragmatic breathing, and proper bearing down techniques was reviewed.     Home Exercises provided: Hamstring stretch, adductor stretch, piriformis stretch, SKTC, hip flexor stretch  Written Home Exercises provided: yes.  Exercises were reviewed and Wilma was able to demonstrate them prior to the end of the session.    Wilma demonstrated good  understanding of the education provided.     See EMR under Patient Instructions for exercises provided 3/22/2023.    Assessment     Wilma is a 38 y.o. male referred to outpatient Physical Therapy with a medical diagnosis of Weak urinary stream [R39.12], Urinary hesitancy [R39.11]. Patient presents with altered posture, poor knowledge of body mechanics and posture, decreased pelvic muscle strength, increased tension of the pelvic muscles, poor quality of pelvic muscle contraction, poor fluid intake, and dysfunctional voiding. Additionally, Wilma presents with core strength deficits, improper breathing mechanics, decreased LE flexibility and gross B hip strength deficits. There's also evidence of scapula winging in quadruped  (left>right), indicating possible serratus weakness. The above mentioned deficits likely contribute to poor stabilization and consequently, an overactive pelvic floor causing bowel/bladder dysfunction. Patient was educated in an home exercise program to initiate progress towards established goals.      Pt prognosis is Good.   Pt will benefit from skilled outpatient Physical Therapy to address the deficits stated above and in the chart below, provide pt/family education, and to maximize pt's level of independence.     Plan of care discussed with patient: Yes  Pt's spiritual, cultural and educational needs considered and patient is agreeable to the plan of care and goals as stated below:     Anticipated Barriers for therapy: none    Medical Necessity is demonstrated by the following:    History  Co-morbidities and personal factors that may impact the plan of care Co-morbidities    has no past medical history on file.   has a past surgical history that includes Leg Surgery; Fracture surgery; and Cystoscopy (N/A, 3/15/2023).      Personal Factors  lifestyle     moderate   Examination  Body structures and functions, activity limitations and participation restrictions that may impact the plan of care Body Regions/Systems/Functions:    altered posture, poor knowledge of body mechanics and posture, decreased pelvic muscle strength, increased tension of the pelvic muscles, poor fluid intake, and dysfunctional voiding     Activity Limitations:  Initiating urine stream    Participation Restrictions:  social activities with friends/family, relationship with spouse/partner, and work duties    Activity limitations:   Learning and applying knowledge  no deficits    General Tasks and Commands  no deficits    Communication  no deficits    Mobility  no deficits    Self care  no deficits    Domestic Life  no deficits    Interactions/Relationships  intimate relationships    Life Areas  employment    Community and Social Life  community  life  recreation and leisure       moderate   Clinical Presentation evolving clinical presentation with changing clinical characteristics moderate   Decision Making/ Complexity Score: moderate       Goals:  Short Term Goals: 6 weeks   Patient to be 100% independent with home exercise program to compliment treatment sessions and facilitate optimal recovery.  Patient to be educated in proper posture/body mechanics with ADLs, IADLs and work-related activities (ie sitting).  Patient to complete a bladder/fluid diary to allow insight into possible behavioral habits that may contribute to symptom exacerbation.     Long Term Goals: 12 weeks   Patient to report 0 episodes of urinary hesitancy for 2 consecutive weeks for optimal urinary function.   Core, pelvic floor and gross B hip strength to improve by 1/2 muscle grade for optimal postuer and bowel/bladder function.  Patient to report 0/10 pain following ejaculation to improve focus and intimacy during intercourse with spouse.    Plan     Plan of care Certification: 3/22/2023 to 06/14/2023.    Outpatient Physical Therapy 1 times weekly for 12 weeks to include the following interventions: therapeutic exercises, therapeutic activity, neuromuscular re-education, manual therapy, modalities PRN, patient/family education, dry needling, and self care/home management. Skilled therapy intervention addressing the evaluative findings and impairments noted above may include: Patient related information education regarding bladder, bowel, and pelvic floor anatomy and function; bladder and bowel health education to include bladder, bowel and food diary as needed. Incorporation of bowel and bladder health and dietary recommendations; instructions in bowel management to include diet, fluids, habit training, and use of supplemental fiber; activities of daily living training to include toileting position modifications and strategies for voiding and defecation. Manual therapy as indicated  to include myofascial release/mobilization, connective tissue manipulation, soft tissue mobilization, trigger point release, visceral mobilization, joint mobilization, and muscle energy techniques - techniques to be performed externally and internally with vaginal and/or rectal approach as necessary to address abdomino-pelvic musculoskeletal dysfunction contributing to diagnosis. Therapeutic exercise instruction for pelvic floor muscle strength and relaxation, core strengthening and trunk stabilization with extremity disassociation, hip flexibility, maintenance of neutral pelvic positioning in sitting, standing and lying down, and with transitional movements. Neuromuscular reeducation for pelvic floor muscle awareness and relaxation response training. EMG and/or biofeedback for pelvic floor musculature training with external and/or internal vaginal or rectal electrode/pressure sensors as indicated. Rectal balloon sensory assessment and retraining. Instruction and review of stress reduction/autonomic nervous system quieting strategies stresses to address sympathetic nervous system response and encourage parasympathetic nervous response to address constipation (symptoms). Incorporation of appropriate treatment strategies into an independent home exercise program.     Shaunna Portillo, PT

## 2023-03-28 ENCOUNTER — DOCUMENTATION ONLY (OUTPATIENT)
Dept: REHABILITATION | Facility: HOSPITAL | Age: 38
End: 2023-03-28

## 2023-03-28 ENCOUNTER — CLINICAL SUPPORT (OUTPATIENT)
Dept: REHABILITATION | Facility: HOSPITAL | Age: 38
End: 2023-03-28
Payer: COMMERCIAL

## 2023-03-28 DIAGNOSIS — R39.11 URINARY HESITANCY: ICD-10-CM

## 2023-03-28 DIAGNOSIS — R39.12 WEAK URINARY STREAM: Primary | ICD-10-CM

## 2023-03-28 PROCEDURE — 97112 NEUROMUSCULAR REEDUCATION: CPT | Mod: PN,CQ

## 2023-03-28 PROCEDURE — 97110 THERAPEUTIC EXERCISES: CPT | Mod: PN,CQ

## 2023-03-28 NOTE — PROGRESS NOTES
"  Pelvic Health Physical Therapy   Treatment Note     Name: Wilma Lamas  Clinic Number: [id number]    Therapy Diagnosis:   Encounter Diagnoses   Name Primary?    Urinary hesitancy     Weak urinary stream Yes     Physician: Jamaal Barragan Jr., MD    Visit Date: 3/28/2023  Physician Orders: PT Eval and Treat   Medical Diagnosis from Referral: Weak urinary stream [R39.12], Urinary hesitancy [R39.11]  Evaluation Date: 3/22/2023  Authorization Period Expiration: 03/15/2023 - 03/14/2024  Plan of Care Expiration: 06/14/2023  Visit # / Visits authorized: 1/ 10   PTA visit: 1/5  Time In: 8:30 AM  Time Out: 9:30 AM  Total Appointment Time (timed & untimed codes): 60 minutes     Precautions: Standard    Subjective     Pt reports: he was compliant with his home exercise program. He reported that he was able to return to work today after volunteering a hair and blood sample for his drug screen at work. He reported chronic low back pain and excessive "popping" in right anterior hip that he associates with a skiing accident he had in his 20s.   He was compliant with home exercise program.  Response to previous treatment: first visit following eval   Functional change: first visit following eval     Pain: /10  Location: bilateral back      Objective     Wilma received therapeutic exercises to develop  strength, endurance, ROM, flexibility, posture, and core stabilization for 45 minutes including:     Hamstring stretch 3 x 30 sec   Seated piriformis stretch 3 x 30   Hip flexor stretch 3 x 30   Adductor stretch in half kneel 3 x 30   Single knee to chest 3 x 30 sec   Modified happy baby ( one leg at a time) 3 x 30     Wilma received the following manual therapy techniques: to develop flexibility, extensibility, and desensitization for 5 minutes including:  the following    Right hip mobs including caudal glides, lateral glides and distraction     Wilma participated in neuromuscular re-education activities to develop Coordination, " Control, Down training, Posture, Proprioception, Kinesthetic, Balance, and Sense for 15 minutes including: diaphragmatic breathing    Diaphragmatic breathing 5 minutes   Down training ( education )   Prone press ups for lumbar extension 20 x     Wilma participated in dynamic functional therapeutic activities to improve functional performance for 0  minutes, including:         Home Exercises Provided and Patient Education Provided     Education provided:   - home exercise program   Discussed progression of plan of care with patient; educated pt in activity modification; reviewed HEP with pt. Pt demonstrated and verbalized understanding of all instruction and was not provided with a handout of HEP (see Patient Instructions).  -     Written Home Exercises Provided: Patient instructed to cont prior HEP.  Exercises were reviewed and Wilma was able to demonstrate them prior to the end of the session.  Wilma demonstrated good  understanding of the education provided.     See EMR under Patient Instructions for exercises provided prior visit.    Assessment     Pt presented to clinic today engaged and willing to participate with PT. He had clearly been compliant with home exercise program but did require minimal cuing for form correction. He reported popping at right anterior hip with performance of some of the exercises so hip mobs were performed to right hip. Moderate restriction noted. Pt is expected to progress towards short and long term therapy goals.   Wilma Is progressing well towards his goals.   Pt prognosis is Good.     Pt will continue to benefit from skilled outpatient physical therapy to address the deficits listed in the problem list box on initial evaluation, provide pt/family education and to maximize pt's level of independence in the home and community environment.     Pt's spiritual, cultural and educational needs considered and pt agreeable to plan of care and goals.     Anticipated barriers to physical  therapy: none    Goals: Goals:  Short Term Goals: 6 weeks   Patient to be 100% independent with home exercise program to compliment treatment sessions and facilitate optimal recovery.  Patient to be educated in proper posture/body mechanics with ADLs, IADLs and work-related activities (ie sitting).  Patient to complete a bladder/fluid diary to allow insight into possible behavioral habits that may contribute to symptom exacerbation.      Long Term Goals: 12 weeks   Patient to report 0 episodes of urinary hesitancy for 2 consecutive weeks for optimal urinary function.   Core, pelvic floor and gross B hip strength to improve by 1/2 muscle grade for optimal postuer and bowel/bladder function.  Patient to report 0/10 pain following ejaculation to improve focus and intimacy during intercourse with spouse.    Plan     Plan of care Certification: 3/22/2023 to 06/14/2023.     Outpatient Physical Therapy 1 times weekly for 12 weeks     Alice Barbosa, PTA

## 2023-04-04 ENCOUNTER — CLINICAL SUPPORT (OUTPATIENT)
Dept: REHABILITATION | Facility: HOSPITAL | Age: 38
End: 2023-04-04
Payer: COMMERCIAL

## 2023-04-04 DIAGNOSIS — R39.11 URINARY HESITANCY: Primary | ICD-10-CM

## 2023-04-04 DIAGNOSIS — M62.81 MUSCLE WEAKNESS: ICD-10-CM

## 2023-04-04 DIAGNOSIS — R39.12 WEAK URINARY STREAM: ICD-10-CM

## 2023-04-04 PROCEDURE — 97112 NEUROMUSCULAR REEDUCATION: CPT | Mod: PN,CQ

## 2023-04-04 PROCEDURE — 97110 THERAPEUTIC EXERCISES: CPT | Mod: PN,CQ

## 2023-04-04 PROCEDURE — 97530 THERAPEUTIC ACTIVITIES: CPT | Mod: PN,CQ

## 2023-04-04 NOTE — PROGRESS NOTES
Pelvic Health Physical Therapy   Treatment Note     Name: Wilma Lamas  Clinic Number: [id number]    Therapy Diagnosis:   Encounter Diagnoses   Name Primary?    Urinary hesitancy Yes    Weak urinary stream     Muscle weakness        Physician: Jamaal Barragan Jr., MD    Visit Date: 4/4/2023  Physician Orders: PT Eval and Treat   Medical Diagnosis from Referral: Weak urinary stream [R39.12], Urinary hesitancy [R39.11]  Evaluation Date: 3/22/2023  Authorization Period Expiration: 03/15/2023 - 03/14/2024  Plan of Care Expiration: 06/14/2023  Visit # / Visits authorized: 2/ 10   PTA visit: 2/5  Time In: 7:30 AM  Time Out: 8:30 AM  Total Appointment Time (timed & untimed codes): 60 minutes     Precautions: Standard    Subjective     Pt reports: he decreased popping in right hip that lasted for most of the week before it recently returned. No other change of symptoms. Pt reports compliance with home exercise program   He was compliant with home exercise program.  Response to previous treatment: no soreness; decreased popping in right hip  Functional change: unremarkable at this time.     Pain: /10  Location: bilateral back      Objective     Wilma received therapeutic exercises to develop  strength, endurance, ROM, flexibility, posture, and core stabilization for 20 minutes including:     Bridges with ball squeeze 3 x 10   Sidelying hip abduction 3 x 10   Clams 3 x 10   Rev clams 3 x 10     Discharged to Home exercise program:   Hamstring stretch 3 x 30 sec   Seated piriformis stretch 3 x 30   Hip flexor stretch 3 x 30   Adductor stretch in half kneel 3 x 30   Single knee to chest 3 x 30 sec   Modified happy baby ( one leg at a time) 3 x 30     Wilma received the following manual therapy techniques: to develop flexibility, extensibility, and desensitization for 5 minutes including:  the following    Right hip mobs including caudal glides, lateral glides and distraction     Wilma participated in neuromuscular re-education  activities to develop Coordination, Control, Down training, Posture, Proprioception, Kinesthetic, Balance, and Sense for 25 minutes including: diaphragmatic breathing      Quadruped alt upper extremity / lower extremity 15 x   Standing rows at cable hugh 30# 3 x 10   Anterior serratus rolls on wall (using physioball today) 2 x 10   Scapular depression into small ball 3 sec hold 2 x 10       Not performed today   Diaphragmatic breathing 5 minutes   Down training ( education )   Prone press ups for lumbar extension 20 x     Wilma participated in dynamic functional therapeutic activities to improve functional performance for 10  minutes, including:    Discussed workplace ergonomics and some cervical exercises that he can perform if his neck starts to bother him at work including:  Cervical retraction x 10  Cervical retraction with extension x 15   Upper trap stretch 3 x 30   Levator scap stretch 3 x 30   Pec stretch in doorway for postural correction 2 x 30          Home Exercises Provided and Patient Education Provided     Education provided:   - home exercise program   Discussed progression of plan of care with patient; educated pt in activity modification; reviewed HEP with pt. Pt demonstrated and verbalized understanding of all instruction and was not provided with a handout of HEP (see Patient Instructions).  -   .hb  Written Home Exercises Provided: Patient instructed to cont prior HEP.  Exercises were reviewed and Wilma was able to demonstrate them prior to the end of the session.  Wilma demonstrated good  understanding of the education provided.     See EMR under Patient Instructions for exercises provided prior visit.    Assessment     Treatment today shifted focus to strengthening of core and pelvic musculature as well as postural correction. He was able to perform all recommended therapeutic exercises without incident. Weak lower trap noted left>right with attempted Ys so scapular depression into ball was  performed for improved activation. Work ergonomics were also addressed with pt as he feels that poor posture has contributed to his issues. He reports both cervical and lumbar pain on regular basis. He has been responding well to lumbar extensions for low back relief so cervical retractions/extensions were added today with instructions to maintain pain free range of motion.     Wilma Is progressing well towards his goals.   Pt prognosis is Good.     Pt will continue to benefit from skilled outpatient physical therapy to address the deficits listed in the problem list box on initial evaluation, provide pt/family education and to maximize pt's level of independence in the home and community environment.     Pt's spiritual, cultural and educational needs considered and pt agreeable to plan of care and goals.     Anticipated barriers to physical therapy: none    Goals: Goals:  Short Term Goals: 6 weeks   Patient to be 100% independent with home exercise program to compliment treatment sessions and facilitate optimal recovery.  Patient to be educated in proper posture/body mechanics with ADLs, IADLs and work-related activities (ie sitting).  Patient to complete a bladder/fluid diary to allow insight into possible behavioral habits that may contribute to symptom exacerbation.      Long Term Goals: 12 weeks   Patient to report 0 episodes of urinary hesitancy for 2 consecutive weeks for optimal urinary function.   Core, pelvic floor and gross B hip strength to improve by 1/2 muscle grade for optimal postuer and bowel/bladder function.  Patient to report 0/10 pain following ejaculation to improve focus and intimacy during intercourse with spouse.    Plan     Plan of care Certification: 3/22/2023 to 06/14/2023.     Outpatient Physical Therapy 1 times weekly for 12 weeks     Alice Barbosa PTA

## 2023-04-07 ENCOUNTER — PATIENT MESSAGE (OUTPATIENT)
Dept: REHABILITATION | Facility: HOSPITAL | Age: 38
End: 2023-04-07
Payer: COMMERCIAL

## 2023-04-13 ENCOUNTER — PATIENT MESSAGE (OUTPATIENT)
Dept: UROLOGY | Facility: CLINIC | Age: 38
End: 2023-04-13
Payer: COMMERCIAL

## 2023-04-13 NOTE — LETTER
April 17, 2023    Wilma Lamas  [address] [facility] [place] [address]             [facility]  [address] [place] [address] [place] [address]  Phone: [phone]  Fax: [phone]   To whom this may concern,     As per your request, I am writing this note to explain your current medical condition regarding difficulty urinating, which may require special accommodations for providing a urine sample for drug testing.     You have been diagnosed with a medical condition that causes difficulty in urination, and this may make it challenging for you to provide a urine sample for drug testing. This condition may result in an inability to fully empty your bladder or a decrease in the flow of urine.     To accommodate your medical condition, we suggest that you inform the drug testing facility in advance about your condition, so they can make arrangements for you to have more time to provide the sample or to use a private restroom. In addition, it may be helpful to drink plenty of fluids before the test to ensure that you have enough urine to provide a sample.     If you have any further questions or concerns, please do not hesitate to contact us.     Sincerely,     Dr. Barragan

## 2023-04-14 ENCOUNTER — CLINICAL SUPPORT (OUTPATIENT)
Dept: REHABILITATION | Facility: HOSPITAL | Age: 38
End: 2023-04-14
Payer: COMMERCIAL

## 2023-04-14 PROCEDURE — 97112 NEUROMUSCULAR REEDUCATION: CPT | Mod: PN

## 2023-04-14 NOTE — PROGRESS NOTES
Pelvic Health Physical Therapy   Treatment Note     Name: Wilma Lamas  Clinic Number: [id number]    Therapy Diagnosis:   No diagnosis found.      Physician: Jamaal Barragan Jr., MD    Visit Date: 4/14/2023  Physician Orders: PT Eval and Treat   Medical Diagnosis from Referral: Weak urinary stream [R39.12], Urinary hesitancy [R39.11]  Evaluation Date: 3/22/2023  Authorization Period Expiration: 03/15/2023 - 03/14/2024  Plan of Care Expiration: 06/14/2023  Visit # / Visits authorized: 3/ 10   PTA visit: 0/5    Time In: 8:30 AM   Time Out: 9:30 AM    Total Appointment Time (timed & untimed codes): 60 minutes     Precautions: Standard    Subjective     Pt reports: he decreased popping in right hip that lasted for most of the week before it recently returned. No other change of symptoms. Pt reports compliance with home exercise program   He was compliant with home exercise program.  Response to previous treatment: no soreness; decreased popping in right hip  Functional change: unremarkable at this time.     Pain: /10  Location: bilateral back      Objective     RUSI assessment 4/14/2023:   - elevated muscle tone noted via shape of bladder base   - difficulty producing and maintaining lift of pelvic floor muscles with attempt at pelvic floor contraction, small amplitude of movement noted.   - lengthening of pelvic floor muscles noted with deep inspiration and bearing down     ABDOMINAL WALL ASSESSMENT 4/14/2023:  Abdominal coordination: poor synergy deep core with poor pressure management, caudally directed forces with increased IAP, oblique dominance; able to decrease oblique activity with verbal cues.   Pelvic Floor Muscle and Transverse Abdominus Synergy: present      Wilma received therapeutic exercises to develop  strength, endurance, ROM, flexibility, posture, and core stabilization for 00 minutes including:     Bridges with ball squeeze 3 x 10   Sidelying hip abduction 3 x 10   Clams 3 x 10   Rev clams 3 x 10      Discharged to Home exercise program:   Hamstring stretch 3 x 30 sec   Seated piriformis stretch 3 x 30   Hip flexor stretch 3 x 30   Adductor stretch in half kneel 3 x 30   Single knee to chest 3 x 30 sec   Modified happy baby ( one leg at a time) 3 x 30     Wilma received the following manual therapy techniques: to develop flexibility, extensibility, and desensitization for 00 minutes including:  the following    Right hip mobs including caudal glides, lateral glides and distraction     Wilma participated in neuromuscular re-education activities to develop Coordination, Control, Down training, Posture, Proprioception, Kinesthetic, Balance, and Sense for 60 minutes including: RUSI for breathing, drops, contractions of the PFM to help pt visualize PFM motion and function. and diaphragmatic breathing with addition of stretches to promote pelvic floor muscle down-training (see Patient Instructions for updated home exercise program)       Not performed today   Diaphragmatic breathing 5 minutes   Down training ( education )   Prone press ups for lumbar extension 20 x   Quadruped alt upper extremity / lower extremity 15 x   Standing rows at cable hugh 30# 3 x 10   Anterior serratus rolls on wall (using physioball today) 2 x 10   Scapular depression into small ball 3 sec hold 2 x 10     Wilma participated in dynamic functional therapeutic activities to improve functional performance for 00 minutes, including:    Discussed workplace ergonomics and some cervical exercises that he can perform if his neck starts to bother him at work including:  Cervical retraction x 10  Cervical retraction with extension x 15   Upper trap stretch 3 x 30   Levator scap stretch 3 x 30   Pec stretch in doorway for postural correction 2 x 30          Home Exercises Provided and Patient Education Provided     Education provided:   - home exercise program   Discussed progression of plan of care with patient; educated pt in activity  modification; reviewed HEP with pt. Pt demonstrated and verbalized understanding of all instruction and was not provided with a handout of HEP (see Patient Instructions).  -   .hb  Written Home Exercises Provided: Patient instructed to cont prior HEP.  Exercises were reviewed and Wilma was able to demonstrate them prior to the end of the session.  Wilma demonstrated good  understanding of the education provided.     See EMR under Patient Instructions for exercises provided prior visit.    Assessment     RUSI utilized to assess pelvic floor function visually. Patient demo signs of increased resting tone of pelvic floor muscles as noted by shape of bladder base and difficulty producing lift of pelvic floor muscles with contraction. Good lengthening noted with bearing down and deep inspiration. Patient does have tendency to over use obliques but able to decrease oblique dominance with verbal cues. Updated home exercise program to promote increased pelvic floor down-training. Discussed performing rectal exam at future session, as well as functional dry needling, patient agrees with these plans.     Wilma Is progressing well towards his goals.   Pt prognosis is Good.     Pt will continue to benefit from skilled outpatient physical therapy to address the deficits listed in the problem list box on initial evaluation, provide pt/family education and to maximize pt's level of independence in the home and community environment.     Pt's spiritual, cultural and educational needs considered and pt agreeable to plan of care and goals.     Anticipated barriers to physical therapy: none    Goals: Goals:  Short Term Goals: 6 weeks   Patient to be 100% independent with home exercise program to compliment treatment sessions and facilitate optimal recovery.  Patient to be educated in proper posture/body mechanics with ADLs, IADLs and work-related activities (ie sitting).  Patient to complete a bladder/fluid diary to allow insight into  possible behavioral habits that may contribute to symptom exacerbation.      Long Term Goals: 12 weeks   Patient to report 0 episodes of urinary hesitancy for 2 consecutive weeks for optimal urinary function.   Core, pelvic floor and gross B hip strength to improve by 1/2 muscle grade for optimal postuer and bowel/bladder function.  Patient to report 0/10 pain following ejaculation to improve focus and intimacy during intercourse with spouse.    Plan     Plan of care Certification: 3/22/2023 to 06/14/2023.     Outpatient Physical Therapy 1 times weekly for 12 weeks     Eva Curtis, PT

## 2023-04-14 NOTE — PATIENT INSTRUCTIONS
"Home Exercise Program: 4/14/2023    DIAPHRAGMATIC BREATHING     The diaphragm is a dome shaped muscle that forms the floor of the rib cage. It is the most efficient muscle for breathing and relaxation, although most people are not used to using the diaphragm. Diaphragmatic or belly breathing is an important technique to learn because it helps settle down or relax the autonomic nervous system. The correct use of diaphragmatic breathing can help to quiet brain activity resulting in the relaxation of all the muscles and organs of the body. This is accomplished by slow rhythmic breathing concentrated in the diaphragm muscle rather than the chest.    How to do proper relaxation breathing:    Start by lying on your back or reclining in a chair in a relaxed position. Place one hand on your chest and the other on your abdomen.  Relax your jaw by placing your tongue on the floor of your mouth and keeping your teeth slightly apart.   Take a deep breath in, letting the abdomen expand and rise while you keep your upper chest, neck and shoulders relaxed.   As you breathe out, allow your abdomen and chest to fall. Exhale completely.  It doesn't matter if you breathe in/out through your nose and/or mouth. Do whichever feels comfortable.  Remember to breathe slowly.  Do not force your breathing. Do not hold your breath.  Repeat while doing stretches listed below:        "single knee to chest" - lay on your back, use your hands to pull your left knee to your chest, keeping the right leg straight on the bed. Hold this pose while you incorporate your diaphragmatic breathing. Repeat on the opposite side. Complete 3 sets of 10 breaths on both legs.           "Happy Baby" - lay on your back. Open your knees slightly wider than your torso, then bring them up toward your armpits. Position each ankle directly over the knee, so your shins are perpendicular to the floor. Flex through the heels. Hold this pose while you incorporate your deep " "breathing. Complete 3 sets of 10 breaths.    HAPPY BABY MODIFICATION:    "Modified Happy Baby" - lay on your back, use your hands to pull your knees to your chest, then bring them out wide (about even with your shoulders).         "Child's Pose" - first start by getting onto your hands and knees, then move your feet so they are touching and your knees are wider than your hips. Sit back so that you are sitting on your heels and reach your arms forward. Think about resting in this position. Complete 3 sets of 10 breaths.      CHILD'S POSE VARIATIONS:                            Deep Squat - Start standing with your feet hip-width apart. Lower yourself down into a low squat position (pictured). Hold onto a sturdy piece of furniture if you need to so that you will feel comfortable enough to relax into this stretch. Try not to hold muscle tension in your hips or thighs as you incorporate your deep breathing in this position.   Complete 3 sets of 10 breaths.         "

## 2023-04-17 ENCOUNTER — PATIENT MESSAGE (OUTPATIENT)
Dept: UROLOGY | Facility: CLINIC | Age: 38
End: 2023-04-17
Payer: COMMERCIAL

## 2023-04-18 ENCOUNTER — PATIENT MESSAGE (OUTPATIENT)
Dept: UROLOGY | Facility: CLINIC | Age: 38
End: 2023-04-18
Payer: COMMERCIAL

## 2023-05-29 PROBLEM — Z00.00 ENCOUNTER FOR MEDICAL EXAMINATION TO ESTABLISH CARE: Status: RESOLVED | Noted: 2023-02-23 | Resolved: 2023-05-29

## 2023-07-27 ENCOUNTER — OFFICE VISIT (OUTPATIENT)
Dept: FAMILY MEDICINE | Facility: CLINIC | Age: 38
End: 2023-07-27
Payer: COMMERCIAL

## 2023-07-27 VITALS
SYSTOLIC BLOOD PRESSURE: 118 MMHG | BODY MASS INDEX: 18.83 KG/M2 | OXYGEN SATURATION: 100 % | HEART RATE: 66 BPM | WEIGHT: 131.5 LBS | HEIGHT: 70 IN | RESPIRATION RATE: 15 BRPM | DIASTOLIC BLOOD PRESSURE: 84 MMHG

## 2023-07-27 DIAGNOSIS — Z76.89 ENCOUNTER TO ESTABLISH CARE WITH NEW DOCTOR: ICD-10-CM

## 2023-07-27 DIAGNOSIS — G89.29 CHRONIC NECK PAIN: ICD-10-CM

## 2023-07-27 DIAGNOSIS — G89.29 CHRONIC LEFT-SIDED LOW BACK PAIN WITHOUT SCIATICA: ICD-10-CM

## 2023-07-27 DIAGNOSIS — M54.2 CERVICALGIA: ICD-10-CM

## 2023-07-27 DIAGNOSIS — Z00.00 ANNUAL PHYSICAL EXAM: Primary | ICD-10-CM

## 2023-07-27 DIAGNOSIS — M54.2 CHRONIC NECK PAIN: ICD-10-CM

## 2023-07-27 DIAGNOSIS — M54.9 DORSALGIA, UNSPECIFIED: ICD-10-CM

## 2023-07-27 DIAGNOSIS — M54.50 CHRONIC LEFT-SIDED LOW BACK PAIN WITHOUT SCIATICA: ICD-10-CM

## 2023-07-27 PROCEDURE — 99999 PR PBB SHADOW E&M-EST. PATIENT-LVL III: ICD-10-PCS | Mod: PBBFAC,,, | Performed by: FAMILY MEDICINE

## 2023-07-27 PROCEDURE — 3008F PR BODY MASS INDEX (BMI) DOCUMENTED: ICD-10-PCS | Mod: S$GLB,,, | Performed by: FAMILY MEDICINE

## 2023-07-27 PROCEDURE — 3079F PR MOST RECENT DIASTOLIC BLOOD PRESSURE 80-89 MM HG: ICD-10-PCS | Mod: S$GLB,,, | Performed by: FAMILY MEDICINE

## 2023-07-27 PROCEDURE — 1159F MED LIST DOCD IN RCRD: CPT | Mod: S$GLB,,, | Performed by: FAMILY MEDICINE

## 2023-07-27 PROCEDURE — 99395 PR PREVENTIVE VISIT,EST,18-39: ICD-10-PCS | Mod: S$GLB,,, | Performed by: FAMILY MEDICINE

## 2023-07-27 PROCEDURE — 3074F PR MOST RECENT SYSTOLIC BLOOD PRESSURE < 130 MM HG: ICD-10-PCS | Mod: S$GLB,,, | Performed by: FAMILY MEDICINE

## 2023-07-27 PROCEDURE — 99395 PREV VISIT EST AGE 18-39: CPT | Mod: S$GLB,,, | Performed by: FAMILY MEDICINE

## 2023-07-27 PROCEDURE — 3079F DIAST BP 80-89 MM HG: CPT | Mod: S$GLB,,, | Performed by: FAMILY MEDICINE

## 2023-07-27 PROCEDURE — 3008F BODY MASS INDEX DOCD: CPT | Mod: S$GLB,,, | Performed by: FAMILY MEDICINE

## 2023-07-27 PROCEDURE — 1160F PR REVIEW ALL MEDS BY PRESCRIBER/CLIN PHARMACIST DOCUMENTED: ICD-10-PCS | Mod: S$GLB,,, | Performed by: FAMILY MEDICINE

## 2023-07-27 PROCEDURE — 3044F HG A1C LEVEL LT 7.0%: CPT | Mod: S$GLB,,, | Performed by: FAMILY MEDICINE

## 2023-07-27 PROCEDURE — 1159F PR MEDICATION LIST DOCUMENTED IN MEDICAL RECORD: ICD-10-PCS | Mod: S$GLB,,, | Performed by: FAMILY MEDICINE

## 2023-07-27 PROCEDURE — 1160F RVW MEDS BY RX/DR IN RCRD: CPT | Mod: S$GLB,,, | Performed by: FAMILY MEDICINE

## 2023-07-27 PROCEDURE — 3044F PR MOST RECENT HEMOGLOBIN A1C LEVEL <7.0%: ICD-10-PCS | Mod: S$GLB,,, | Performed by: FAMILY MEDICINE

## 2023-07-27 PROCEDURE — 99999 PR PBB SHADOW E&M-EST. PATIENT-LVL III: CPT | Mod: PBBFAC,,, | Performed by: FAMILY MEDICINE

## 2023-07-27 PROCEDURE — 3074F SYST BP LT 130 MM HG: CPT | Mod: S$GLB,,, | Performed by: FAMILY MEDICINE

## 2023-07-27 NOTE — PROGRESS NOTES
[facility] - Clinic Note    Subjective      Mr. Lamas is a 38 y.o. male who presents to clinic for an annual.    Patient new to me.   Labs done earlier this year and discussed with patient.   He reports chronic back and neck pain.   Back pain has been present for more than 10 years after obtaining an injury in water sports. He had a leg fracture at that time as well.   Locates the pain on the left lower back with no radiation.   Pain has gotten more progressive and constant to where it is interfering with his ADLs and work.   Sharp in nature.   He is unable to sit properly and his gait is slightly antalgic when he has a flare of the pain.   Denies saddle anesthesia or bladder/bowel incontinence.   Through out the years he has tried a regimented physical therapy and at home, chiropractor, and yoga with no relief.   He has taken NSAIDs as well with no relief.     Neck pain: present for the past 2-3 years  Started suddenly with no previous trauma.   He reports that his neck will get stiff to wear he is unable to turn his neck to either side. Will take about a week for it to subside.     [facility] Wilma has no past medical history on file.   [facility] Wilma has a past surgical history that includes Leg Surgery; Fracture surgery; and Cystoscopy (N/A, 3/15/2023).    Wilma's family history is not on file.    Wilma reports that he has never smoked. He has never used smokeless tobacco. He reports that he does not drink alcohol and does not use drugs.   [facility] Wilma has No Known Allergies.   [facility] currently has no medications in their medication list.     Review of Systems   Constitutional:  Negative for activity change, appetite change, chills, fatigue and fever.   Eyes:  Negative for visual disturbance.   Respiratory:  Negative for cough and shortness of breath.    Cardiovascular:  Negative for chest pain, palpitations and leg swelling.   Gastrointestinal:  Negative for abdominal pain, nausea and vomiting.  "  Musculoskeletal:  Positive for arthralgias, back pain, gait problem, neck pain and neck stiffness. Negative for joint swelling.   Skin:  Negative for wound.   Neurological:  Negative for dizziness, numbness and headaches.   Psychiatric/Behavioral:  Negative for confusion.      Objective     /84 (BP Location: Left arm, Patient Position: Sitting, BP Method: Medium (Manual))   Pulse 66   Resp 15   Ht 5' 10" (1.778 m)   Wt 59.6 kg (131 lb 8 oz)   SpO2 100%   BMI 18.87 kg/m²     Physical Exam   Constitutional: normal appearance.  Non-toxic appearance. No distress. He does not appear ill.   HENT:   Head: Normocephalic and atraumatic.   Eyes: Right eye exhibits no discharge. Left eye exhibits no discharge.   Neck:   Neck: No effusion, erythema, ecchymosis, warmth, or deformities noted.  +TTP throughout the cervical spine.   ROM intact but pain with manipulation. Strength 4/5   Cardiovascular: Normal rate, regular rhythm, normal heart sounds and normal pulses. Exam reveals no gallop and no friction rub.   No murmur heard.Pulmonary:      Effort: Pulmonary effort is normal. No respiratory distress.      Breath sounds: Normal breath sounds. No wheezing, rhonchi or rales.     Abdominal: Normal appearance.   Musculoskeletal:      Cervical back: Neck supple. No rigidity or tenderness.      Right lower leg: No edema.      Left lower leg: No edema.      Comments: Back: No effusion, erythema, ecchymosis, warmth, or deformities noted.  +TTP in the lumbar spinal and left paraspinal region around L4-S1.   Negative STL bilaterally.   Limited ROM with flexion.    Lymphadenopathy:     He has no cervical adenopathy.   Neurological: He is alert.   Skin: Skin is warm and dry. Capillary refill takes less than 2 seconds. He is not diaphoretic.   Psychiatric: His behavior is normal. Mood, judgment and thought content normal.   Vitals reviewed.     Assessment/Plan     Wilma was seen today for annual exam.    Diagnoses and all " orders for this visit:  -New patient and new problem to me    Annual physical exam    Dorsalgia, unspecified  -     MRI Lumbar Spine Without Contrast; Future    Chronic left-sided low back pain without sciatica  -     CT Cervical Spine Without Contrast; Future    Chronic neck pain  -     CT Cervical Spine Without Contrast; Future    Encounter to establish care with new doctor    Cervicalgia  -     CT Cervical Spine Without Contrast; Future      -obtain imaging and treat as indicated        Montse Edwards MD  Family Medicine  [facility]

## 2023-08-03 ENCOUNTER — PATIENT MESSAGE (OUTPATIENT)
Dept: FAMILY MEDICINE | Facility: CLINIC | Age: 38
End: 2023-08-03
Payer: COMMERCIAL

## 2023-08-31 ENCOUNTER — PATIENT MESSAGE (OUTPATIENT)
Dept: FAMILY MEDICINE | Facility: CLINIC | Age: 38
End: 2023-08-31
Payer: COMMERCIAL

## 2023-09-18 ENCOUNTER — TELEPHONE (OUTPATIENT)
Dept: FAMILY MEDICINE | Facility: CLINIC | Age: 38
End: 2023-09-18
Payer: COMMERCIAL

## 2023-09-18 NOTE — TELEPHONE ENCOUNTER
Left detailed message that referrals were submitted through the [employer] portal  Ref ID [id number].  Can call [facility] and give them ref ID to check.

## 2025-06-17 ENCOUNTER — OFFICE VISIT (OUTPATIENT)
Dept: FAMILY MEDICINE | Facility: CLINIC | Age: 40
End: 2025-06-17
Payer: COMMERCIAL

## 2025-06-17 VITALS
WEIGHT: 128 LBS | DIASTOLIC BLOOD PRESSURE: 72 MMHG | OXYGEN SATURATION: 99 % | HEART RATE: 78 BPM | SYSTOLIC BLOOD PRESSURE: 104 MMHG | HEIGHT: 70 IN | BODY MASS INDEX: 18.32 KG/M2

## 2025-06-17 DIAGNOSIS — Z00.00 ANNUAL PHYSICAL EXAM: Primary | ICD-10-CM

## 2025-06-17 DIAGNOSIS — R46.89: ICD-10-CM

## 2025-06-17 PROBLEM — R39.11 URINARY HESITANCY: Status: RESOLVED | Noted: 2023-02-23 | Resolved: 2025-06-17

## 2025-06-17 PROBLEM — M62.81 MUSCLE WEAKNESS: Status: RESOLVED | Noted: 2023-03-22 | Resolved: 2025-06-17

## 2025-06-17 PROBLEM — R39.12 WEAK URINARY STREAM: Status: RESOLVED | Noted: 2023-02-23 | Resolved: 2025-06-17

## 2025-06-17 PROBLEM — R33.9 URINARY RETENTION: Status: RESOLVED | Noted: 2023-02-23 | Resolved: 2025-06-17

## 2025-06-17 PROCEDURE — 3078F DIAST BP <80 MM HG: CPT | Mod: S$GLB,,, | Performed by: FAMILY MEDICINE

## 2025-06-17 PROCEDURE — 99999 PR PBB SHADOW E&M-EST. PATIENT-LVL III: CPT | Mod: PBBFAC,,, | Performed by: FAMILY MEDICINE

## 2025-06-17 PROCEDURE — 3008F BODY MASS INDEX DOCD: CPT | Mod: S$GLB,,, | Performed by: FAMILY MEDICINE

## 2025-06-17 PROCEDURE — 1159F MED LIST DOCD IN RCRD: CPT | Mod: S$GLB,,, | Performed by: FAMILY MEDICINE

## 2025-06-17 PROCEDURE — 3074F SYST BP LT 130 MM HG: CPT | Mod: S$GLB,,, | Performed by: FAMILY MEDICINE

## 2025-06-17 PROCEDURE — 99395 PREV VISIT EST AGE 18-39: CPT | Mod: S$GLB,,, | Performed by: FAMILY MEDICINE

## 2025-06-17 NOTE — PROGRESS NOTES
"[facility]      Subjective         Chief Complaint   Patient presents with    Establish Care      Pleasant 40-year-old male presents to establish/PCP/annual wellness exam.  Wants to establish care, has no health issues.  Takes no medication.  .  No children.  History of urinary hesitancy particularly with requirements to participate in urine drug screens as part of job requirements.  Never positive urine drug screen.  Sleeps well.  Physically active.  Denies tobacco abuse.  PHQ 2:0.    History reviewed. No pertinent past medical history.     Past Surgical History:   Procedure Laterality Date    CYSTOSCOPY N/A 3/15/2023    Procedure: CYSTOSCOPY;  Surgeon: Jamaal Barragan Jr., MD;  Location: [facility] OR;  Service: Urology;  Laterality: N/A;    FRACTURE SURGERY      LEG SURGERY          Review of patient's allergies indicates:  No Known Allergies     Review of Systems   Constitutional: Negative.    HENT: Negative.     Eyes: Negative.    Respiratory: Negative.     Cardiovascular: Negative.    Gastrointestinal: Negative.    Genitourinary: Negative.    Musculoskeletal: Negative.    Skin: Negative.    Neurological: Negative.    Endo/Heme/Allergies: Negative.    Psychiatric/Behavioral: Negative.                 Objective      Vitals:    06/17/25 1309   BP: 104/72   BP Location: Left arm   Patient Position: Sitting   Pulse: 78   SpO2: 99%   Weight: 58.1 kg (128 lb)   Height: 5' 10" (1.778 m)        Physical Exam  Vitals reviewed.   Constitutional:       Appearance: Normal appearance. He is not ill-appearing.   HENT:      Head: Normocephalic and atraumatic.      Right Ear: Tympanic membrane and ear canal normal.      Left Ear: Tympanic membrane and ear canal normal.      Ears:      Comments: Nonobstructive cerumen bilaterally     Nose: Nose normal.      Mouth/Throat:      Mouth: Mucous membranes are moist.      Pharynx: Oropharynx is clear.   Eyes:      Extraocular Movements: Extraocular " "movements intact.      Conjunctiva/sclera: Conjunctivae normal.   Cardiovascular:      Rate and Rhythm: Normal rate and regular rhythm.      Pulses: Normal pulses.   Pulmonary:      Effort: Pulmonary effort is normal.      Breath sounds: Normal breath sounds.   Abdominal:      General: Abdomen is flat.      Palpations: Abdomen is soft. There is no mass.      Tenderness: There is no abdominal tenderness.   Musculoskeletal:         General: No swelling or tenderness. Normal range of motion.      Cervical back: Neck supple. No tenderness.   Lymphadenopathy:      Cervical: No cervical adenopathy.   Skin:     General: Skin is warm and dry.      Capillary Refill: Capillary refill takes less than 2 seconds.   Neurological:      General: No focal deficit present.      Mental Status: He is alert.   Psychiatric:         Mood and Affect: Mood normal.         No results found for: "TSH"     Lab Results   Component Value Date    HGBA1C 4.9 02/23/2023      Lab Results   Component Value Date    HGBA1C 4.9 02/23/2023     CMP  Sodium   Date Value Ref Range Status   02/23/2023 139 136 - 145 mmol/L Final     Potassium   Date Value Ref Range Status   02/23/2023 5.0 3.5 - 5.1 mmol/L Final     Chloride   Date Value Ref Range Status   02/23/2023 104 95 - 110 mmol/L Final     CO2   Date Value Ref Range Status   02/23/2023 27 23 - 29 mmol/L Final     Glucose   Date Value Ref Range Status   02/23/2023 92 70 - 110 mg/dL Final     BUN   Date Value Ref Range Status   02/23/2023 16 6 - 20 mg/dL Final     Creatinine   Date Value Ref Range Status   02/23/2023 0.9 0.5 - 1.4 mg/dL Final     Calcium   Date Value Ref Range Status   02/23/2023 9.8 8.7 - 10.5 mg/dL Final     Total Protein   Date Value Ref Range Status   02/23/2023 7.7 6.0 - 8.4 g/dL Final     Albumin   Date Value Ref Range Status   02/23/2023 4.6 3.5 - 5.2 g/dL Final     Total Bilirubin   Date Value Ref Range Status   02/23/2023 0.5 0.1 - 1.0 mg/dL Final     Comment:     For infants and " newborns, interpretation of results should be based  on gestational age, weight and in agreement with clinical  observations.    Premature Infant recommended reference ranges:  Up to 24 hours.............<8.0 mg/dL  Up to 48 hours............<12.0 mg/dL  3-5 days..................<15.0 mg/dL  6-29 days.................<15.0 mg/dL       Alkaline Phosphatase   Date Value Ref Range Status   02/23/2023 83 55 - 135 U/L Final     AST   Date Value Ref Range Status   02/23/2023 24 10 - 40 U/L Final     ALT   Date Value Ref Range Status   02/23/2023 21 10 - 44 U/L Final     Anion Gap   Date Value Ref Range Status   02/23/2023 8 8 - 16 mmol/L Final      Lab Results   Component Value Date    CHOL 148 02/23/2023     Lab Results   Component Value Date    HDL 53 02/23/2023     Lab Results   Component Value Date    LDLCALC 84.0 02/23/2023     Lab Results   Component Value Date    TRIG 55 02/23/2023     Lab Results   Component Value Date    CHOLHDL 35.8 02/23/2023                  Assessment & Plan     Assessment & Plan  Annual physical exam  Recommend annual physical exam.  Discussed age, gender, wrists based screenings.  Ordered.         Thin build in adult  Recommend healthy nutritional diet.  Recommended Mediterranean Diet  Eating Heart-Healthy Food: Using the DASH Plan  Eating for your heart doesn't have to be hard or boring. You just need to know how to make healthier choices. The DASH eating plan has been developed to help you do just that. DASH stands for Dietary Approaches to Stop Hypertension. It is a plan that has been proven to be healthier for your heart and to lower your risk for high blood pressure. It can also help lower your risk for cancer, heart disease, osteoporosis, and diabetes.  Choosing from Each Food Group  Choose foods from each of the food groups below each day. Try to get the recommended number of servings for each food group. The serving numbers are based on a diet of 2,000 calories a day. Talk to your  doctor if you're unsure about your calorie needs.  Grains   Servings: 7-8 a day  A serving is:  1 slice bread  1 ounce dry cereal  half a cup cooked rice or pasta  Best choices: Whole grains and any grains high in fiber.  Vegetables   Servings: 4-5 a day  A serving is:  1 cup raw leafy vegetable  Half a cup cooked vegetable  Three-quarter cup vegetable juice  Best choices: Fresh or frozen vegetable prepared without too much added salt or fat.    Fruits   Servings: 4-5 a day  A serving is:  Three-quarter cup fruit juice  1 medium fruit  One-quarter cup dried fruit  One-half cup fresh, frozen, or canned fruit  Best choices: A variety of fresh fruits of different colors. Whole fruits are a much better choice than fruit juices.  Low-fat or Fat Free Dairy   Servings: 2-3 a day  A serving is:  8 ounces milk  1 cup yogurt  One and a half ounces cheese  Best choices: Skim or 1% milk, low-fat or fat free yogurt or buttermilk, and low-fat cheeses.       Meat, Poultry, Fish   Servings: 2 or fewer a day  A serving is:  3 ounces cooked meat, poultry, or fish  Best choices: Lean meats and fish. Trim away visible fat. Broil, roast, or boil instead of frying. Remove skin from poultry before eating.  Nuts, Seeds, Beans   Servings: 4-5 a week  A serving is:  One third cup nuts (or one and a half ounces)  2 tablespoons sunflower seeds  Half a cup cooked beans  Best choices: Dry roasted nuts with no salt added, lentils, kidney beans, garbanzo beans, and whole luna beans.    Fats and Oils   Servings: 2 a day  A serving is:  1 teaspoon vegetable oil  1 teaspoon soft margarine  1 tablespoon low-fat mayonnaise  1 teaspoon regular mayonnaise  2 tablespoons light salad dressing  1 tablespoon regular salad dressing  Best choices: Monounsaturated and polyunsaturated fats such as olive, canola, or safflower oil.  Sweets   Servings: 5 a week or fewer  A serving is:  1 tablespoon sugar, maple syrup, or honey  1 tablespoon jam or jelly  1  half-ounce jelly beans (about 15)  8 ounces lemonade  Best choices: Dried fruit can be a satisfying sweet. Choose low-fat sweets when possible. And watch your serving sizes!       Aerobic Exercise for a Healthy Heart  Exercise is a lot more than an energy booster and a stress reliever. It also strengthens your heart muscle, lowers your blood pressure and blood cholesterol, and burns calories.               Bear Barragan MD  [facility]  [address] [place] [address],[place] [id number] [phone]

## (undated) DEVICE — SOL STERILE WATER INJ 1000ML

## (undated) DEVICE — DRAPE MINOR FEN 98X77X121IN

## (undated) DEVICE — DRAPE MEDIUM SHEET 40X70IN

## (undated) DEVICE — SET CYSTO IRR DRP CHMBR 84IN

## (undated) DEVICE — GOWN X-LG STERILE BACK

## (undated) DEVICE — SPONGE GAUZE 16PLY 4X4

## (undated) DEVICE — GLOVE SURG ULTRA TOUCH 7.5

## (undated) DEVICE — GLOVE SURGEONS ULTRA TOUCH 6.5

## (undated) DEVICE — SCRUB 10% POVIDONE IODINE 4OZ